# Patient Record
Sex: FEMALE | Race: WHITE | Employment: OTHER | URBAN - NONMETROPOLITAN AREA
[De-identification: names, ages, dates, MRNs, and addresses within clinical notes are randomized per-mention and may not be internally consistent; named-entity substitution may affect disease eponyms.]

---

## 2019-04-05 ENCOUNTER — APPOINTMENT (OUTPATIENT)
Dept: GENERAL RADIOLOGY | Age: 69
DRG: 373 | End: 2019-04-05
Payer: MEDICARE

## 2019-04-05 ENCOUNTER — APPOINTMENT (OUTPATIENT)
Dept: CT IMAGING | Age: 69
DRG: 373 | End: 2019-04-05
Payer: MEDICARE

## 2019-04-05 ENCOUNTER — HOSPITAL ENCOUNTER (INPATIENT)
Age: 69
LOS: 3 days | Discharge: HOME | DRG: 373 | End: 2019-04-08
Attending: EMERGENCY MEDICINE | Admitting: INTERNAL MEDICINE
Payer: MEDICARE

## 2019-04-05 DIAGNOSIS — I48.91 NEW ONSET ATRIAL FIBRILLATION (HCC): ICD-10-CM

## 2019-04-05 DIAGNOSIS — S09.90XA INJURY OF HEAD, INITIAL ENCOUNTER: Primary | ICD-10-CM

## 2019-04-05 DIAGNOSIS — R77.8 ELEVATED TROPONIN: ICD-10-CM

## 2019-04-05 DIAGNOSIS — J18.9 PNEUMONIA DUE TO ORGANISM: ICD-10-CM

## 2019-04-05 DIAGNOSIS — J10.1 INFLUENZA A: ICD-10-CM

## 2019-04-05 DIAGNOSIS — E86.0 DEHYDRATION: ICD-10-CM

## 2019-04-05 DIAGNOSIS — S01.01XA LACERATION OF SCALP, INITIAL ENCOUNTER: ICD-10-CM

## 2019-04-05 LAB
ALBUMIN SERPL-MCNC: 3.7 GM/DL (ref 3.4–5)
ALP BLD-CCNC: 46 U/L (ref 46–116)
ALT SERPL-CCNC: 33 U/L (ref 14–63)
ANION GAP: 18 MEQ/L (ref 8–16)
AST SERPL-CCNC: 49 U/L (ref 15–37)
BILIRUB SERPL-MCNC: 1.1 MG/DL (ref 0.2–1)
BUN BLDV-MCNC: 30 MG/DL (ref 7–18)
CHLORIDE BLD-SCNC: 98 MEQ/L (ref 98–107)
CO2: 19 MEQ/L (ref 21–32)
CREAT SERPL-MCNC: 1.6 MG/DL (ref 0.6–1.3)
FLU A ANTIGEN: POSITIVE
FLU B ANTIGEN: NEGATIVE
GFR, ESTIMATED: 34 ML/MIN/1.73M2
GLUCOSE BLD-MCNC: 126 MG/DL (ref 74–106)
GLUCOSE BLD-MCNC: 90 MG/DL (ref 70–108)
HCT VFR BLD CALC: 43.4 % (ref 37–47)
HEMOGLOBIN: 14.6 GM/DL (ref 12–16)
LACTATE: 1.6 MMOL/L (ref 0.9–1.7)
MCH RBC QN AUTO: 27.9 PG (ref 27–31)
MCHC RBC AUTO-ENTMCNC: 33.7 GM/DL (ref 33–37)
MCV RBC AUTO: 82.8 FL (ref 81–99)
PDW BLD-RTO: 13.3 % (ref 11.5–14.5)
PLATELET # BLD: 192 THOU/MM3 (ref 130–400)
PMV BLD AUTO: 7.8 FL (ref 7.4–10.4)
POC CALCIUM: 9 MG/DL (ref 8.5–10.1)
POTASSIUM SERPL-SCNC: 3.9 MEQ/L (ref 3.5–5.1)
PROCALCITONIN: 0.08 NG/ML (ref 0.01–0.09)
RBC # BLD: 5.24 MILL/MM3 (ref 4.2–5.4)
SODIUM BLD-SCNC: 135 MEQ/L (ref 136–145)
TOTAL PROTEIN: 7.4 GM/DL (ref 6.4–8.2)
TROPONIN I: 0.07 NG/ML (ref 0.02–0.05)
TROPONIN T: < 0.01 NG/ML
TROPONIN T: < 0.01 NG/ML
WBC # BLD: 4.5 THOU/MM3 (ref 4.8–10.8)

## 2019-04-05 PROCEDURE — 2709999900 HC NON-CHARGEABLE SUPPLY

## 2019-04-05 PROCEDURE — 96368 THER/DIAG CONCURRENT INF: CPT

## 2019-04-05 PROCEDURE — 2500000003 HC RX 250 WO HCPCS: Performed by: EMERGENCY MEDICINE

## 2019-04-05 PROCEDURE — 83605 ASSAY OF LACTIC ACID: CPT

## 2019-04-05 PROCEDURE — 99223 1ST HOSP IP/OBS HIGH 75: CPT | Performed by: INTERNAL MEDICINE

## 2019-04-05 PROCEDURE — 99285 EMERGENCY DEPT VISIT HI MDM: CPT

## 2019-04-05 PROCEDURE — 2140000000 HC CCU INTERMEDIATE R&B

## 2019-04-05 PROCEDURE — 96376 TX/PRO/DX INJ SAME DRUG ADON: CPT

## 2019-04-05 PROCEDURE — 12051 INTMD RPR FACE/MM 2.5 CM/<: CPT

## 2019-04-05 PROCEDURE — 84484 ASSAY OF TROPONIN QUANT: CPT

## 2019-04-05 PROCEDURE — 80053 COMPREHEN METABOLIC PANEL: CPT

## 2019-04-05 PROCEDURE — 71101 X-RAY EXAM UNILAT RIBS/CHEST: CPT

## 2019-04-05 PROCEDURE — 84145 PROCALCITONIN (PCT): CPT

## 2019-04-05 PROCEDURE — 2580000003 HC RX 258: Performed by: INTERNAL MEDICINE

## 2019-04-05 PROCEDURE — 6360000002 HC RX W HCPCS: Performed by: EMERGENCY MEDICINE

## 2019-04-05 PROCEDURE — 82948 REAGENT STRIP/BLOOD GLUCOSE: CPT

## 2019-04-05 PROCEDURE — 36415 COLL VENOUS BLD VENIPUNCTURE: CPT

## 2019-04-05 PROCEDURE — 2580000003 HC RX 258: Performed by: EMERGENCY MEDICINE

## 2019-04-05 PROCEDURE — 96375 TX/PRO/DX INJ NEW DRUG ADDON: CPT

## 2019-04-05 PROCEDURE — 93005 ELECTROCARDIOGRAM TRACING: CPT | Performed by: EMERGENCY MEDICINE

## 2019-04-05 PROCEDURE — 85027 COMPLETE CBC AUTOMATED: CPT

## 2019-04-05 PROCEDURE — 87040 BLOOD CULTURE FOR BACTERIA: CPT

## 2019-04-05 PROCEDURE — 93005 ELECTROCARDIOGRAM TRACING: CPT | Performed by: INTERNAL MEDICINE

## 2019-04-05 PROCEDURE — 96372 THER/PROPH/DIAG INJ SC/IM: CPT

## 2019-04-05 PROCEDURE — 1200000000 HC SEMI PRIVATE

## 2019-04-05 PROCEDURE — 96365 THER/PROPH/DIAG IV INF INIT: CPT

## 2019-04-05 PROCEDURE — 87804 INFLUENZA ASSAY W/OPTIC: CPT

## 2019-04-05 PROCEDURE — 70450 CT HEAD/BRAIN W/O DYE: CPT

## 2019-04-05 PROCEDURE — 6370000000 HC RX 637 (ALT 250 FOR IP): Performed by: INTERNAL MEDICINE

## 2019-04-05 RX ORDER — SODIUM CHLORIDE 9 MG/ML
INJECTION, SOLUTION INTRAVENOUS CONTINUOUS
Status: ACTIVE | OUTPATIENT
Start: 2019-04-05 | End: 2019-04-06

## 2019-04-05 RX ORDER — NITROGLYCERIN 0.4 MG/1
0.4 TABLET SUBLINGUAL EVERY 5 MIN PRN
Status: DISCONTINUED | OUTPATIENT
Start: 2019-04-05 | End: 2019-04-08 | Stop reason: HOSPADM

## 2019-04-05 RX ORDER — DILTIAZEM HYDROCHLORIDE 5 MG/ML
10 INJECTION INTRAVENOUS ONCE
Status: COMPLETED | OUTPATIENT
Start: 2019-04-05 | End: 2019-04-05

## 2019-04-05 RX ORDER — LOSARTAN POTASSIUM 50 MG/1
50 TABLET ORAL DAILY
Status: DISCONTINUED | OUTPATIENT
Start: 2019-04-05 | End: 2019-04-05

## 2019-04-05 RX ORDER — ZOLPIDEM TARTRATE 5 MG/1
5 TABLET ORAL NIGHTLY PRN
Status: DISCONTINUED | OUTPATIENT
Start: 2019-04-05 | End: 2019-04-08 | Stop reason: HOSPADM

## 2019-04-05 RX ORDER — ONDANSETRON 2 MG/ML
4 INJECTION INTRAMUSCULAR; INTRAVENOUS EVERY 6 HOURS PRN
Status: DISCONTINUED | OUTPATIENT
Start: 2019-04-05 | End: 2019-04-08 | Stop reason: HOSPADM

## 2019-04-05 RX ORDER — LOSARTAN POTASSIUM 50 MG/1
50 TABLET ORAL DAILY
COMMUNITY

## 2019-04-05 RX ORDER — CITALOPRAM 10 MG/1
10 TABLET ORAL DAILY
COMMUNITY

## 2019-04-05 RX ORDER — LORAZEPAM 2 MG/ML
0.5 INJECTION INTRAMUSCULAR ONCE
Status: COMPLETED | OUTPATIENT
Start: 2019-04-05 | End: 2019-04-05

## 2019-04-05 RX ORDER — MELOXICAM 15 MG/1
15 TABLET ORAL DAILY
Status: ON HOLD | COMMUNITY
End: 2019-04-08 | Stop reason: HOSPADM

## 2019-04-05 RX ORDER — ISOSORBIDE MONONITRATE 30 MG/1
30 TABLET, EXTENDED RELEASE ORAL DAILY
COMMUNITY

## 2019-04-05 RX ORDER — EXEMESTANE 25 MG/1
25 TABLET ORAL NIGHTLY
Status: DISCONTINUED | OUTPATIENT
Start: 2019-04-05 | End: 2019-04-08 | Stop reason: HOSPADM

## 2019-04-05 RX ORDER — SODIUM CHLORIDE 0.9 % (FLUSH) 0.9 %
10 SYRINGE (ML) INJECTION PRN
Status: DISCONTINUED | OUTPATIENT
Start: 2019-04-05 | End: 2019-04-08 | Stop reason: HOSPADM

## 2019-04-05 RX ORDER — OSELTAMIVIR PHOSPHATE 30 MG/1
30 CAPSULE ORAL 2 TIMES DAILY
Status: DISCONTINUED | OUTPATIENT
Start: 2019-04-05 | End: 2019-04-06

## 2019-04-05 RX ORDER — CLOPIDOGREL BISULFATE 75 MG/1
75 TABLET ORAL DAILY
Status: DISCONTINUED | OUTPATIENT
Start: 2019-04-05 | End: 2019-04-08 | Stop reason: HOSPADM

## 2019-04-05 RX ORDER — LEVOFLOXACIN 5 MG/ML
500 INJECTION, SOLUTION INTRAVENOUS ONCE
Status: COMPLETED | OUTPATIENT
Start: 2019-04-05 | End: 2019-04-05

## 2019-04-05 RX ORDER — ATORVASTATIN CALCIUM 40 MG/1
40 TABLET, FILM COATED ORAL NIGHTLY
Status: DISCONTINUED | OUTPATIENT
Start: 2019-04-05 | End: 2019-04-08 | Stop reason: HOSPADM

## 2019-04-05 RX ORDER — ACETAMINOPHEN 325 MG/1
650 TABLET ORAL EVERY 4 HOURS PRN
Status: DISCONTINUED | OUTPATIENT
Start: 2019-04-05 | End: 2019-04-08 | Stop reason: HOSPADM

## 2019-04-05 RX ORDER — SODIUM CHLORIDE 0.9 % (FLUSH) 0.9 %
10 SYRINGE (ML) INJECTION EVERY 12 HOURS SCHEDULED
Status: DISCONTINUED | OUTPATIENT
Start: 2019-04-05 | End: 2019-04-08 | Stop reason: HOSPADM

## 2019-04-05 RX ORDER — MELOXICAM 7.5 MG/1
15 TABLET ORAL DAILY
Status: DISCONTINUED | OUTPATIENT
Start: 2019-04-05 | End: 2019-04-08 | Stop reason: HOSPADM

## 2019-04-05 RX ORDER — PANTOPRAZOLE SODIUM 40 MG/1
40 TABLET, DELAYED RELEASE ORAL
Status: DISCONTINUED | OUTPATIENT
Start: 2019-04-06 | End: 2019-04-06

## 2019-04-05 RX ORDER — OMEPRAZOLE 20 MG/1
20 CAPSULE, DELAYED RELEASE ORAL DAILY
COMMUNITY

## 2019-04-05 RX ORDER — 0.9 % SODIUM CHLORIDE 0.9 %
1000 INTRAVENOUS SOLUTION INTRAVENOUS ONCE
Status: COMPLETED | OUTPATIENT
Start: 2019-04-05 | End: 2019-04-05

## 2019-04-05 RX ORDER — DILTIAZEM HYDROCHLORIDE 5 MG/ML
5 INJECTION INTRAVENOUS ONCE
Status: COMPLETED | OUTPATIENT
Start: 2019-04-05 | End: 2019-04-05

## 2019-04-05 RX ORDER — DIGOXIN 0.25 MG/ML
500 INJECTION INTRAMUSCULAR; INTRAVENOUS ONCE
Status: COMPLETED | OUTPATIENT
Start: 2019-04-05 | End: 2019-04-05

## 2019-04-05 RX ORDER — LIDOCAINE HYDROCHLORIDE AND EPINEPHRINE 10; 10 MG/ML; UG/ML
20 INJECTION, SOLUTION INFILTRATION; PERINEURAL ONCE
Status: COMPLETED | OUTPATIENT
Start: 2019-04-05 | End: 2019-04-05

## 2019-04-05 RX ORDER — ASPIRIN 325 MG
162 TABLET ORAL DAILY
Status: DISCONTINUED | OUTPATIENT
Start: 2019-04-05 | End: 2019-04-08 | Stop reason: HOSPADM

## 2019-04-05 RX ORDER — ISOSORBIDE MONONITRATE 30 MG/1
30 TABLET, EXTENDED RELEASE ORAL DAILY
Status: DISCONTINUED | OUTPATIENT
Start: 2019-04-06 | End: 2019-04-08 | Stop reason: HOSPADM

## 2019-04-05 RX ORDER — CITALOPRAM 20 MG/1
10 TABLET ORAL DAILY
Status: DISCONTINUED | OUTPATIENT
Start: 2019-04-05 | End: 2019-04-08 | Stop reason: HOSPADM

## 2019-04-05 RX ADMIN — CITALOPRAM 10 MG: 20 TABLET, FILM COATED ORAL at 20:54

## 2019-04-05 RX ADMIN — SODIUM CHLORIDE 1000 ML: 9 INJECTION, SOLUTION INTRAVENOUS at 13:23

## 2019-04-05 RX ADMIN — SODIUM CHLORIDE: 9 INJECTION, SOLUTION INTRAVENOUS at 20:28

## 2019-04-05 RX ADMIN — LORAZEPAM 0.5 MG: 2 INJECTION INTRAMUSCULAR; INTRAVENOUS at 16:22

## 2019-04-05 RX ADMIN — ACETAMINOPHEN 650 MG: 325 TABLET ORAL at 21:31

## 2019-04-05 RX ADMIN — ATORVASTATIN CALCIUM 40 MG: 40 TABLET, FILM COATED ORAL at 20:24

## 2019-04-05 RX ADMIN — ENOXAPARIN SODIUM 90 MG: 100 INJECTION SUBCUTANEOUS at 16:00

## 2019-04-05 RX ADMIN — LIDOCAINE HYDROCHLORIDE,EPINEPHRINE BITARTRATE 20 ML: 10; .01 INJECTION, SOLUTION INFILTRATION; PERINEURAL at 13:30

## 2019-04-05 RX ADMIN — CLOPIDOGREL BISULFATE 75 MG: 75 TABLET ORAL at 20:21

## 2019-04-05 RX ADMIN — ASPIRIN 162 MG: 325 TABLET ORAL at 20:21

## 2019-04-05 RX ADMIN — DILTIAZEM HYDROCHLORIDE 10 MG: 5 INJECTION INTRAVENOUS at 15:32

## 2019-04-05 RX ADMIN — OSELTAMIVIR PHOSPHATE 30 MG: 30 CAPSULE ORAL at 21:20

## 2019-04-05 RX ADMIN — METOPROLOL TARTRATE 37.5 MG: 25 TABLET ORAL at 20:25

## 2019-04-05 RX ADMIN — DILTIAZEM HYDROCHLORIDE 5 MG/ML: 5 INJECTION INTRAVENOUS at 15:35

## 2019-04-05 RX ADMIN — DILTIAZEM HYDROCHLORIDE 5 MG: 5 INJECTION INTRAVENOUS at 16:03

## 2019-04-05 RX ADMIN — LEVOFLOXACIN 500 MG: 5 INJECTION, SOLUTION INTRAVENOUS at 15:12

## 2019-04-05 RX ADMIN — DIGOXIN 500 MCG: 0.25 INJECTION INTRAMUSCULAR; INTRAVENOUS at 15:29

## 2019-04-05 ASSESSMENT — ENCOUNTER SYMPTOMS
EYE DISCHARGE: 0
SHORTNESS OF BREATH: 0
BLOOD IN STOOL: 0
ABDOMINAL PAIN: 0
SORE THROAT: 0
EYE PAIN: 0
WHEEZING: 0
COUGH: 1
DIARRHEA: 1
VOMITING: 0
BACK PAIN: 1
NAUSEA: 1

## 2019-04-05 ASSESSMENT — PAIN SCALES - GENERAL
PAINLEVEL_OUTOF10: 0
PAINLEVEL_OUTOF10: 7
PAINLEVEL_OUTOF10: 2
PAINLEVEL_OUTOF10: 4

## 2019-04-05 ASSESSMENT — PAIN DESCRIPTION - LOCATION
LOCATION: RIB CAGE
LOCATION: BACK;RIB CAGE
LOCATION: BACK;RIB CAGE

## 2019-04-05 ASSESSMENT — PAIN DESCRIPTION - PAIN TYPE
TYPE: ACUTE PAIN

## 2019-04-05 ASSESSMENT — PAIN DESCRIPTION - ORIENTATION
ORIENTATION: LEFT

## 2019-04-05 NOTE — ED NOTES
Dr Raylene Leyden talking with Dr Nancy Serrato and admission process started.      Nathan Swann, MELINDA  04/05/19 5796

## 2019-04-05 NOTE — ED PROVIDER NOTES
(Bilateral); Ectopic pregnancy surgery; and Finger surgery. CURRENT MEDICATIONS       Previous Medications    ASPIRIN 81 MG TABLET    Take 162 mg by mouth daily     ATORVASTATIN (LIPITOR) 40 MG TABLET    Take 1 tablet by mouth nightly    CITALOPRAM (CELEXA) 10 MG TABLET    Take 10 mg by mouth daily    CLOPIDOGREL (PLAVIX) 75 MG TABLET    Take 1 tablet by mouth daily    EXEMESTANE (AROMASIN) 25 MG CHEMO TABLET    Take 25 mg by mouth nightly    ISOSORBIDE MONONITRATE (IMDUR) 30 MG EXTENDED RELEASE TABLET    Take 30 mg by mouth daily    LOSARTAN (COZAAR) 50 MG TABLET    Take 50 mg by mouth daily    MELOXICAM (MOBIC) 15 MG TABLET    Take 15 mg by mouth daily    METOPROLOL (LOPRESSOR) 25 MG TABLET    Take 25 mg by mouth 2 times daily    NITROGLYCERIN (NITROSTAT) 0.4 MG SL TABLET    Place 1 tablet under the tongue every 5 minutes as needed for Chest pain    OMEPRAZOLE (PRILOSEC) 20 MG DELAYED RELEASE CAPSULE    Take 20 mg by mouth daily    ZOLPIDEM (AMBIEN) 10 MG TABLET    Take by mouth nightly as needed for Sleep       ALLERGIES     is allergic to bactrim [sulfamethoxazole-trimethoprim]. FAMILY HISTORY     indicated that her mother is . She indicated that her father is . She indicated that the status of her neg hx is unknown. She indicated that the status of her maternal cousin is unknown.   family history includes Diabetes in her maternal cousin and mother; High Blood Pressure in her father; Stroke in her father. SOCIAL HISTORY      reports that she has never smoked. She has never used smokeless tobacco. She reports that she does not drink alcohol or use drugs. PHYSICAL EXAM     INITIAL VITALS:  height is 5' 6\" (1.676 m) and weight is 220 lb (99.8 kg). Her oral temperature is 98.2 °F (36.8 °C). Her blood pressure is 109/91 (abnormal) and her pulse is 161. Her respiration is 12 and oxygen saturation is 95%. Physical Exam   Constitutional: She is oriented to person, place, and time.  She (*)     AST 49 (*)     Total Bilirubin 1.1 (*)     All other components within normal limits   GLOMERULAR FILTRATION RATE, ESTIMATED - Abnormal; Notable for the following components:    GFR, Estimated 34 (*)     All other components within normal limits   ANION GAP - Abnormal; Notable for the following components:    Anion Gap 18.0 (*)     All other components within normal limits   CULTURE BLOOD #1   CULTURE BLOOD #2   TROPONIN   LACTIC ACID   Laboratory studies showed the elevation of the creatinine, slightly elevated troponin and possible influenza A. EMERGENCY DEPARTMENT COURSE:   Vitals:    Vitals:    04/05/19 1234 04/05/19 1457 04/05/19 1523 04/05/19 1546   BP: (!) 137/100 (!) 125/100 (!) 112/90 (!) 109/91   Pulse: 98 140 173 161   Resp: 18 20 17 12   Temp: 98.2 °F (36.8 °C)      TempSrc: Oral      SpO2: 97% 97% 96% 95%   Weight: 220 lb (99.8 kg)      Height: 5' 6\" (1.676 m)        She received normal saline IV, Levaquin IV. At 3:20 PM.  She went into A. fib. Heart rate was between 160-180. She was started on a reasonable is on trips. She also was given digoxin 0.5 mg IV. Office was given Lovenox 90 mg subcu. She remained awake and alert. Reevaluation at 4.25 p.m:  Is awake, alert, she converted to sinus rhythm, heart rate was 99. PROCEDURES:  Forehead laceration repair:  2 cm laceration above the medial aspect of her left eyebrow. Topical anesthesia was achieved with 5 mL lidocaine with epinephrine. I explored the wound. There is no foreign body, irrigated with saline and Betadine. Simultaneous tissue was repaired with 4 interrupted stitches of 4. 0 Vicryl. The skin was repaired with 6 interrupted stitches of 5. 0 nylon. Critical care:  Due to the immediate potential for life-threatening deterioration due to head injury, new -onset A. fib, dehydration and pneumonia , I spent 35  minutes providing critical care. This time is excluding time spent performing procedures.     FINAL IMPRESSION 1. Injury of head, initial encounter    2. Laceration of scalp, initial encounter    3. Pneumonia due to organism    4. Dehydration    5. Elevated troponin    6. New onset atrial fibrillation ([facility] [address]    7.  Influenza A          DISPOSITION/PLAN   Admit to the hospital, condition is critical    PATIENT REFERRED TO:  Deion Lucero, [facility]            DISCHARGE MEDICATIONS:  New Prescriptions    No medications on file       (Please note that portions of this note were completed with a voice recognition program.  Efforts were made to edit the dictations but occasionally words are mis-transcribed.)    MD Ricki Alves MD  04/05/19 9201

## 2019-04-05 NOTE — ED NOTES
Pt resting in room, pt denies any complaints. Monitor:NSR rate 94, Dr Clementine Joyner informed.      Reid Copeland RN  04/05/19 3452

## 2019-04-05 NOTE — PROGRESS NOTES
Pharmacy Renal Adjustment    Nicole Najera is a 79 y.o. female. Pharmacy renally adjust the following medications per P&T approved policy: Tamiflu    Recent Labs     04/05/19  1329   BUN 30*       Recent Labs     04/05/19  1329   CREATININE 1.6*       Estimated Creatinine Clearance: 41 mL/min (A) (based on SCr of 1.6 mg/dL (H)). Calculated CrCl:    Height:   Ht Readings from Last 1 Encounters:   04/05/19 5' 6\" (1.676 m)     Weight:  Wt Readings from Last 1 Encounters:   04/05/19 220 lb (99.8 kg)       CKD stage: 1         Baseline SCr: 0.8-1.2  Results for Franko Mccartney (MRN [id number]) as of 4/5/2019 19:29   Ref.  Range 4/2/2012 10:03 9/3/2015 08:28 9/4/2015 04:34 2/12/2019 09:45 4/5/2019 13:29   Creatinine Latest Ref Range: 0.6 - 1.3 mg/dl 0.8 1.2 0.8 1.1 1.6 (H)       Plan: Adjustments based on renal function:          Decrease oseltamivir to 30 mg bid

## 2019-04-06 PROBLEM — A04.72 C. DIFFICILE DIARRHEA: Status: ACTIVE | Noted: 2019-04-06

## 2019-04-06 PROBLEM — I48.91 ATRIAL FIBRILLATION, RAPID (HCC): Status: ACTIVE | Noted: 2019-04-06

## 2019-04-06 LAB
ADENOVIRUS F 40 41 PCR: NOT DETECTED
ANION GAP SERPL CALCULATED.3IONS-SCNC: 14 MEQ/L (ref 8–16)
ASTROVIRUS PCR: NOT DETECTED
BUN BLDV-MCNC: 22 MG/DL (ref 7–22)
CALCIUM SERPL-MCNC: 8 MG/DL (ref 8.5–10.5)
CAMPYLOBACTER PCR: NOT DETECTED
CHLORIDE BLD-SCNC: 107 MEQ/L (ref 98–111)
CLOSTRIDIUM DIFFICILE, PCR: DETECTED
CO2: 16 MEQ/L (ref 23–33)
CREAT SERPL-MCNC: 1 MG/DL (ref 0.4–1.2)
CRYPTOSPORIDIUM PCR: NOT DETECTED
CYCLOSPORA CAYETANENSIS PCR: NOT DETECTED
E COLI 0157 PCR: ABNORMAL
E COLI ENTEROAGGREGATIVE PCR: NOT DETECTED
E COLI ENTEROPATHOGENIC PCR: NOT DETECTED
E COLI ENTEROTOXIGENIC PCR: NOT DETECTED
E COLI SHIGA LIKE TOXIN PCR: NOT DETECTED
E COLI SHIGELLA/ENTEROINVASIVE PCR: NOT DETECTED
E HISTOLYTICA GI FILM ARRAY: NOT DETECTED
EKG ATRIAL RATE: 149 BPM
EKG ATRIAL RATE: 65 BPM
EKG ATRIAL RATE: 87 BPM
EKG P AXIS: 59 DEGREES
EKG P AXIS: 71 DEGREES
EKG P AXIS: 95 DEGREES
EKG P-R INTERVAL: 126 MS
EKG P-R INTERVAL: 150 MS
EKG P-R INTERVAL: 156 MS
EKG Q-T INTERVAL: 310 MS
EKG Q-T INTERVAL: 374 MS
EKG Q-T INTERVAL: 434 MS
EKG QRS DURATION: 100 MS
EKG QRS DURATION: 90 MS
EKG QRS DURATION: 96 MS
EKG QTC CALCULATION (BAZETT): 450 MS
EKG QTC CALCULATION (BAZETT): 451 MS
EKG QTC CALCULATION (BAZETT): 488 MS
EKG R AXIS: -25 DEGREES
EKG R AXIS: -26 DEGREES
EKG R AXIS: -51 DEGREES
EKG T AXIS: 70 DEGREES
EKG T AXIS: 74 DEGREES
EKG T AXIS: 85 DEGREES
EKG VENTRICULAR RATE: 149 BPM
EKG VENTRICULAR RATE: 65 BPM
EKG VENTRICULAR RATE: 87 BPM
ERYTHROCYTE [DISTWIDTH] IN BLOOD BY AUTOMATED COUNT: 14.6 % (ref 11.5–14.5)
ERYTHROCYTE [DISTWIDTH] IN BLOOD BY AUTOMATED COUNT: 46.9 FL (ref 35–45)
GFR SERPL CREATININE-BSD FRML MDRD: 55 ML/MIN/1.73M2
GIARDIA LAMBLIA PCR: NOT DETECTED
GLUCOSE BLD-MCNC: 102 MG/DL (ref 70–108)
GLUCOSE BLD-MCNC: 96 MG/DL (ref 70–108)
HCT VFR BLD CALC: 40.9 % (ref 37–47)
HEMOGLOBIN: 12.5 GM/DL (ref 12–16)
MAGNESIUM: 2.1 MG/DL (ref 1.6–2.4)
MCH RBC QN AUTO: 26.9 PG (ref 26–33)
MCHC RBC AUTO-ENTMCNC: 30.6 GM/DL (ref 32.2–35.5)
MCV RBC AUTO: 88 FL (ref 81–99)
NOROVIRUS GI GII PCR: NOT DETECTED
PLATELET # BLD: 151 THOU/MM3 (ref 130–400)
PLESIOMONAS SHIGELLOIDES PCR: NOT DETECTED
PMV BLD AUTO: 10.4 FL (ref 9.4–12.4)
POTASSIUM REFLEX MAGNESIUM: 3.7 MEQ/L (ref 3.5–5.2)
RBC # BLD: 4.65 MILL/MM3 (ref 4.2–5.4)
ROTAVIRUS A PCR: NOT DETECTED
SALMONELLA PCR: NOT DETECTED
SAPOVIRUS PCR: NOT DETECTED
SODIUM BLD-SCNC: 137 MEQ/L (ref 135–145)
VIBRIO CHOLERAE PCR: NOT DETECTED
VIBRIO PCR: NOT DETECTED
WBC # BLD: 5.1 THOU/MM3 (ref 4.8–10.8)
YERSINIA ENTEROCOLITICA PCR: NOT DETECTED

## 2019-04-06 PROCEDURE — 6370000000 HC RX 637 (ALT 250 FOR IP): Performed by: INTERNAL MEDICINE

## 2019-04-06 PROCEDURE — 85027 COMPLETE CBC AUTOMATED: CPT

## 2019-04-06 PROCEDURE — 6370000000 HC RX 637 (ALT 250 FOR IP): Performed by: PHYSICIAN ASSISTANT

## 2019-04-06 PROCEDURE — 2580000003 HC RX 258: Performed by: INTERNAL MEDICINE

## 2019-04-06 PROCEDURE — 93010 ELECTROCARDIOGRAM REPORT: CPT | Performed by: INTERNAL MEDICINE

## 2019-04-06 PROCEDURE — 83735 ASSAY OF MAGNESIUM: CPT

## 2019-04-06 PROCEDURE — 2140000000 HC CCU INTERMEDIATE R&B

## 2019-04-06 PROCEDURE — 99233 SBSQ HOSP IP/OBS HIGH 50: CPT | Performed by: INTERNAL MEDICINE

## 2019-04-06 PROCEDURE — 82948 REAGENT STRIP/BLOOD GLUCOSE: CPT

## 2019-04-06 PROCEDURE — 36415 COLL VENOUS BLD VENIPUNCTURE: CPT

## 2019-04-06 PROCEDURE — 80048 BASIC METABOLIC PNL TOTAL CA: CPT

## 2019-04-06 PROCEDURE — 6360000002 HC RX W HCPCS: Performed by: INTERNAL MEDICINE

## 2019-04-06 PROCEDURE — 2709999900 HC NON-CHARGEABLE SUPPLY

## 2019-04-06 PROCEDURE — 87507 IADNA-DNA/RNA PROBE TQ 12-25: CPT

## 2019-04-06 PROCEDURE — 93005 ELECTROCARDIOGRAM TRACING: CPT | Performed by: INTERNAL MEDICINE

## 2019-04-06 PROCEDURE — 6370000000 HC RX 637 (ALT 250 FOR IP): Performed by: PHARMACIST

## 2019-04-06 RX ORDER — OSELTAMIVIR PHOSPHATE 75 MG/1
75 CAPSULE ORAL 2 TIMES DAILY
Status: DISCONTINUED | OUTPATIENT
Start: 2019-04-06 | End: 2019-04-08 | Stop reason: HOSPADM

## 2019-04-06 RX ORDER — POTASSIUM CHLORIDE 20 MEQ/1
20 TABLET, EXTENDED RELEASE ORAL ONCE
Status: COMPLETED | OUTPATIENT
Start: 2019-04-06 | End: 2019-04-06

## 2019-04-06 RX ORDER — METOPROLOL TARTRATE 50 MG/1
50 TABLET, FILM COATED ORAL 2 TIMES DAILY
Status: DISCONTINUED | OUTPATIENT
Start: 2019-04-06 | End: 2019-04-08 | Stop reason: HOSPADM

## 2019-04-06 RX ORDER — BENZONATATE 100 MG/1
200 CAPSULE ORAL 3 TIMES DAILY PRN
Status: DISCONTINUED | OUTPATIENT
Start: 2019-04-06 | End: 2019-04-08 | Stop reason: HOSPADM

## 2019-04-06 RX ADMIN — ISOSORBIDE MONONITRATE 30 MG: 30 TABLET ORAL at 09:04

## 2019-04-06 RX ADMIN — SODIUM CHLORIDE: 9 INJECTION, SOLUTION INTRAVENOUS at 15:17

## 2019-04-06 RX ADMIN — Medication 10 ML: at 21:42

## 2019-04-06 RX ADMIN — METOPROLOL TARTRATE 37.5 MG: 25 TABLET ORAL at 06:53

## 2019-04-06 RX ADMIN — DILTIAZEM HYDROCHLORIDE 30 MG: 30 TABLET, FILM COATED ORAL at 17:22

## 2019-04-06 RX ADMIN — ATORVASTATIN CALCIUM 40 MG: 40 TABLET, FILM COATED ORAL at 21:41

## 2019-04-06 RX ADMIN — SODIUM CHLORIDE: 9 INJECTION, SOLUTION INTRAVENOUS at 02:48

## 2019-04-06 RX ADMIN — PANTOPRAZOLE SODIUM 40 MG: 40 TABLET, DELAYED RELEASE ORAL at 05:30

## 2019-04-06 RX ADMIN — Medication 125 MG: at 09:06

## 2019-04-06 RX ADMIN — BENZONATATE 200 MG: 100 CAPSULE ORAL at 11:27

## 2019-04-06 RX ADMIN — OSELTAMIVIR PHOSPHATE 75 MG: 75 CAPSULE ORAL at 09:08

## 2019-04-06 RX ADMIN — Medication 125 MG: at 13:27

## 2019-04-06 RX ADMIN — ASPIRIN 162 MG: 325 TABLET ORAL at 09:05

## 2019-04-06 RX ADMIN — POTASSIUM CHLORIDE 20 MEQ: 20 TABLET, EXTENDED RELEASE ORAL at 07:27

## 2019-04-06 RX ADMIN — Medication 125 MG: at 17:17

## 2019-04-06 RX ADMIN — ZOLPIDEM TARTRATE 5 MG: 5 TABLET ORAL at 21:41

## 2019-04-06 RX ADMIN — CLOPIDOGREL BISULFATE 75 MG: 75 TABLET ORAL at 09:04

## 2019-04-06 RX ADMIN — Medication 125 MG: at 21:42

## 2019-04-06 RX ADMIN — METOPROLOL TARTRATE 50 MG: 25 TABLET ORAL at 19:52

## 2019-04-06 RX ADMIN — ENOXAPARIN SODIUM 100 MG: 100 INJECTION SUBCUTANEOUS at 05:30

## 2019-04-06 RX ADMIN — OSELTAMIVIR PHOSPHATE 75 MG: 75 CAPSULE ORAL at 21:42

## 2019-04-06 RX ADMIN — ACETAMINOPHEN 650 MG: 325 TABLET ORAL at 21:17

## 2019-04-06 ASSESSMENT — PAIN SCALES - GENERAL
PAINLEVEL_OUTOF10: 0
PAINLEVEL_OUTOF10: 5

## 2019-04-06 ASSESSMENT — PAIN DESCRIPTION - LOCATION: LOCATION: BACK;RIB CAGE

## 2019-04-06 ASSESSMENT — PAIN DESCRIPTION - DESCRIPTORS: DESCRIPTORS: ACHING

## 2019-04-06 ASSESSMENT — PAIN DESCRIPTION - FREQUENCY: FREQUENCY: INTERMITTENT

## 2019-04-06 ASSESSMENT — PAIN DESCRIPTION - ONSET: ONSET: GRADUAL

## 2019-04-06 ASSESSMENT — PAIN DESCRIPTION - ORIENTATION: ORIENTATION: LEFT

## 2019-04-06 ASSESSMENT — PAIN DESCRIPTION - PAIN TYPE: TYPE: ACUTE PAIN

## 2019-04-06 NOTE — CONSULTS
[address] [place] [address]                                  CONSULTATION    PATIENT NAME: Greg Martin                  :        1950  MED REC NO:   [id number]                           ROOM:       0033  ACCOUNT NO:   [de-identified]                           ADMIT DATE: 2019  PROVIDER:     Saadia Bar. Kirby Casanova M.D.    Gladys Garrido:  2019    REASON FOR CONSULTATION:  Paroxysmal atrial fibrillation. HISTORY OF PRESENT ILLNESS:  This is a patient who is a 82-year-old lady  who is well known to me from prior encounters. She had a prior history  of intervention of the obtuse marginal few years ago. The patient  apparently has been having diarrhea and loss of appetite. She had been  drinking tea and she had syncopal episode while she was at home when she  moved her head from one direction to another. She did have trauma to  the front of the head with few sutures. The patient was found to have  pneumonia, was admitted to the hospital.  The patient denied chest pain  per se. She denied palpitations. While in the hospital, she had  episode of atrial fibrillation, paroxysmal, short-lived and variable in  degree. The patient in between had sinus bradycardia. The patient had  no similar episode in the past.    REVIEW OF SYSTEMS:  She had no history of CVA or TIA. No history of  thromboembolic phenomena. She is not aware of diabetes. She had a  history of borderline hypertension. She had a history of coronary  artery disease. As mentioned, the patient has no history of thyroid  disease. The patient has no history of GI bleed or bleeding disorder. She has been having diarrhea and she was found to have C. difficile. She was found also to have flu. The patient has no claudication. She  had a low-grade fever. No psychological disorders. SOCIAL HISTORY:  She denied smoking or alcohol abuse.     ALLERGIES:  The patient is allergic to BACTRIM. PHYSICAL EXAMINATION:  VITAL SIGNS:  When I saw the patient, she was in sinus rhythm. The  blood pressure was 106/61. Heart rate runs in the 60s. The patient's  monitor strip showed intermittent episodes of atrial fibrillation, rapid  ventricular response. It is getting better now, more in sinus rhythm  since her Lopressor dose increased and she was placed on Cardizem. NEUROLOGIC:  She had no focal neurological deficits. NECK:  She had no JVD. There were no carotid bruits. LUNGS:  She had scattered expiratory rhonchi. HEART:  There was no S3.  ABDOMEN:  Soft. GENITALIA/RECTAL:  Deferred. EXTREMITIES:  Lower limbs, there was no edema. LABORATORY DATA:  The patient's lab work on admission was BUN 30,  creatinine 1.6. The blood sugar was 126. Hemoglobin was 14.6 and  hematocrit was 43. Her EKG today showed sinus rhythm. No acute changes. SUMMARY:  1. The patient admitted with syncopal episode, probably postural  hypotension, exacerbated by sudden movement of the head and dehydration. 2.  The patient has been having loss of appetite, diarrhea, and she had  C. difficile, probably dehydration. She was drinking a lot of tea and  that is what increased her dehydration. 3.  History of coronary artery disease. History of prior intervention  of the circumflex, stable. 4.  History of paroxysmal atrial fibrillation. She is back in sinus  rhythm. She has been on aspirin and Plavix. The patient has a low  CHADS score at this point and with recent head trauma, I will try to  avoid anticoagulation. The patient seemed to be in sinus rhythm now. 5.  History of hypertension, under control. 6.  C. difficile. 7.  Pneumonia. Cardiacwise, the patient is stable. Continue current treatment. Medical management. The patient will be seen in the office. Pending  her clinical course, further recommendation will be made.     We thank you very much for allowing us to share in the management of  this patient. Please call if you have any questions. Rene Jones M.D.    D: 04/06/2019 13:28:49       T: 04/06/2019 13:56:53     [email or web address]  Job#: [id number]     Doc#: [id number]    CC:  Emily Donaldson M.D.        Referring Service

## 2019-04-06 NOTE — PLAN OF CARE
Problem: Falls - Risk of:  Goal: Will remain free from falls  Description  Will remain free from falls  Outcome: Met This Shift  Note:   Continue with fall precautions, patient up with assist.      Problem: Falls - Risk of:  Goal: Absence of physical injury  Description  Absence of physical injury  Outcome: Met This Shift     Problem: Pain:  Goal: Patient's pain/discomfort is manageable  Description  Patient's pain/discomfort is manageable  Outcome: Met This Shift  Note:   Denies pain today. Problem: SKIN INTEGRITY  Goal: Skin integrity is maintained or improved  Outcome: Met This Shift  Note:   Continue to assess sutures to forehead. Problem: Discharge Planning:  Goal: Patients continuum of care needs are met  Description  Patients continuum of care needs are met  Outcome: Ongoing  Note:   Continue discharge planning. Patient lives at home with . Care plan reviewed with patient. Patient verbalize understanding of the plan of care and contribute to goal setting.

## 2019-04-06 NOTE — FLOWSHEET NOTE
04/06/19 0520   Provider Notification   Reason for Communication Evaluate   Provider Name [facility]   Provider Notification Physician Assistant   Method of Communication Secure Message   Response Waiting for response   Notification Time 0520   Potassium 3.7 this morning. Can we have cardiac replacement please? She has 29 beats of asymptomatic v tach tonight.

## 2019-04-06 NOTE — PLAN OF CARE
Problem: Falls - Risk of:  Goal: Will remain free from falls  Description  Will remain free from falls  Note:   Assessment & interventions provided throughout shift. Bed locked & in low position, call light in reach, side-rails up x2, non-slip socks on when ambulating, reminded patient to use call light to call for assistance. Bed alarm on. Problem: Pain:  Goal: Patient's pain/discomfort is manageable  Description  Patient's pain/discomfort is manageable  Note:   Ongoing assessment & interventions provided throughout shift. Reminded patient to report any pain, pressure, or shortness of breath to the nurse. Pain medications provided per physician's orders. Problem: Discharge Planning:  Goal: Patients continuum of care needs are met  Description  Patients continuum of care needs are met  Note:   Patient from home with . Problem: SKIN INTEGRITY  Goal: Skin integrity is maintained or improved  Note:   Ongoing assessment & interventions provided throughout shift. Skin assessments provided. Encouraging patient to turn as needed. Patient incontinent of stool- keep clean and dry. Care plan reviewed with patient. Patient verbalizes understanding of the care plan and contributed to goal setting.

## 2019-04-06 NOTE — H&P
MG tablet Take by mouth nightly as needed for Sleep   Yes Historical Provider, MD   nitroGLYCERIN (NITROSTAT) 0.4 MG SL tablet Place 1 tablet under the tongue every 5 minutes as needed for Chest pain 9/4/15   Javier Thakkar MD   exemestane (AROMASIN) 25 MG chemo tablet Take 25 mg by mouth nightly    Historical Provider, MD       Allergies:  Bactrim [sulfamethoxazole-trimethoprim]    Social History:      The patient currently lives at home    TOBACCO:   reports that she has never smoked. She has never used smokeless tobacco.  ETOH:   reports that she does not drink alcohol. Family History:      Reviewed in detail. Positive as follows:        Problem Relation Age of Onset    Diabetes Mother         adult onset    High Blood Pressure Father     Stroke Father     Diabetes Maternal Cousin     Cancer Neg Hx     Heart Disease Neg Hx        REVIEW OF SYSTEMS:   14 point review of system performed, pertinent positives as noted in the HPI, all other systems negative/at baseline. PHYSICAL EXAM:    /80   Pulse 69   Temp 97.8 °F (36.6 °C) (Oral)   Resp 16   Ht 5' 6\" (1.676 m)   Wt 218 lb (98.9 kg)   SpO2 96%   BMI 35.19 kg/m²       General appearance:  No apparent distress, appears stated age and cooperative. HEENT:  Normal cephalic, atraumatic without obvious deformity. Pupils equal, round, and reactive to light. Extra ocular muscles intact. Conjunctivae/corneas clear. Neck: Supple, with full range of motion. No jugular venous distention. Trachea midline. Respiratory:  Normal respiratory effort. Clear to auscultation, bilaterally without Rales/Wheezes/Rhonchi. Cardiovascular:  Regular rate and rhythm with normal S1/S2 without murmurs, rubs or gallops. Abdomen: Soft, non-tender, non-distended with normal bowel sounds. Musculoskeletal:  No clubbing, cyanosis or edema bilaterally. Full range of motion without deformity.   Skin: Skin color, texture, turgor normal.  No rashes or lesions but repaired lac above left eye. Neurologic:  Neurovascularly intact without any focal sensory/motor deficits. Cranial nerves: II-XII intact, grossly non-focal.  Psychiatric:  Alert and oriented, thought content appropriate, normal insight  Capillary Refill: Brisk,< 3 seconds   Peripheral Pulses: +2 palpable, equal bilaterally       Labs:     Recent Labs     04/05/19  1329 04/06/19  0357   WBC 4.5* 5.1   HGB 14.6 12.5   HCT 43.4 40.9    151     Recent Labs     04/05/19  1329 04/06/19  0357   * 137   K 3.9 3.7   CL 98 107   CO2 19* 16*   BUN 30* 22   CREATININE 1.6* 1.0   CALCIUM  --  8.0*     Recent Labs     04/05/19  1329   AST 49*   ALT 33   BILITOT 1.1*   ALKPHOS 46     No results for input(s): INR in the last 72 hours. Recent Labs     04/05/19  1329   TROPONINI 0.065       Urinalysis:    No results found for: Addie Civil, BACTERIA, RBCUA, BLOODU, [address]    Radiology:   I have reviewed the imaging studies with the following interpretation:  XR RIBS LEFT INCLUDE CHEST (MIN 3 VIEWS)   Final Result   1. No rib fracture seen. 2. Small patch atelectasis/pneumonia left parahilar region. **This report has been created using voice recognition software. It may contain minor errors which are inherent in voice recognition technology. **      Final report electronically signed by Dr. Linn Boggs on 4/5/2019 1:53 PM      CT HEAD WO CONTRAST   Final Result    No evidence of acute intracranial abnormality. **This report has been created using voice recognition software. It may contain minor errors which are inherent in voice recognition technology. **      Final report electronically signed by Dr. Chidi Saba MD on 4/5/2019 1:53 PM          EKG:  I have reviewed the EKG with the following interpretation: NSR on tele    Diet:  DIET CARDIAC; No Caffeine    DVT prophylaxis: [x] Lovenox                                 [] SCDs                                 [] SQ Heparin                                 [] Encourage ambulation           [] Already on Anticoagulation    Code Status: Full Code      PT/OT Eval Status: not at this time    Disposition:    [x] Home       [] TCU       [] Rehab       [] Psych       [x] SNF       [] [facility]       [] Other-       Assessment and Plan:    Principal Problem:    Influenza A  Active Problems:    C. difficile diarrhea    Atrial fibrillation, rapid ([address]  Resolved Problems:    * No resolved hospital problems. *        · Admit for influenza and AFib  · Now in NSR, monitor on telemetry anticoagulate for now  · Tamiflu  · Sent stool for PCR testing, is positive for c diff, start PO vanco 125mg q6h  · Continue home medications, increase lopressor and hold losartan for now  · Continue with plavix and ASA      Thank you Nuzhat Olivo MD for the opportunity to be involved in this patient's care.     Electronically signed by Rupa Fair DO on 4/6/2019 at 7:17 AM

## 2019-04-06 NOTE — PROGRESS NOTES
Hospitalist Progress Note    Patient:  González Winkler      Unit/Bed:3B-33/033-A    YOB: 1950    MRN: [id number]       Acct: [de-identified]     PCP: Neli Jack MD    Date of Admission: 4/5/2019    Assessment/Plan:    1. Influenza- minmal sxs of- fatigue x one week; minimal cough. On rx  2. c diff pos- no diarrhea till admitted=on vanco  3. parox afib- new- boost metoprolol, added diltiazem. Lytes throid ok. Consult Dr Alfrieda Lesch. Concerned re full anticoagulation due to head injury 4.5.    4. Cad- trops neg, ekg no acute change        Expected discharge date:  1-2 days    Disposition:    [x] Home       [] TCU       [] Rehab       [] Psych       [] SNF       [] [facility]       [] Other-    Chief Complaint: dizzy, fell in [place], hit head. Hospital Course: Just arrived. Having short runs of afib/rvr.   trops neg     Subjective (past 24 hours): cough       Medications:  Reviewed    Infusion Medications    sodium chloride 100 mL/hr at 04/06/19 0248     Scheduled Medications    potassium (CARDIAC) replacement protocol   Other RX Placeholder    vancomycin  125 mg Oral 4x Daily    oseltamivir  75 mg Oral BID    metoprolol tartrate  50 mg Oral BID    diltiazem  30 mg Oral 4 times per day    [Held by provider] meloxicam  15 mg Oral Daily    isosorbide mononitrate  30 mg Oral Daily    exemestane  25 mg Oral Nightly    clopidogrel  75 mg Oral Daily    citalopram  10 mg Oral Daily    atorvastatin  40 mg Oral Nightly    aspirin  162 mg Oral Daily    sodium chloride flush  10 mL Intravenous 2 times per day     PRN Meds: benzonatate, zolpidem, nitroGLYCERIN, sodium chloride flush, magnesium hydroxide, ondansetron, acetaminophen      Intake/Output Summary (Last 24 hours) at 4/6/2019 0857  Last data filed at 4/6/2019 0423  Gross per 24 hour   Intake 1264.34 ml   Output 300 ml   Net 964.34 ml       Diet:  DIET CARDIAC; No Caffeine    Exam:  /88   Pulse 66   Temp 98.2 °F (36.8 °C) (Oral)   Resp 16   Ht 5' 6\" (1.676 m)   Wt 218 lb (98.9 kg)   SpO2 96%   BMI 35.19 kg/m²     General appearance: No apparent distress, appears stated age and cooperative. HEENT: sutures forehead  Neck: Supple, with full range of motion. No jugular venous distention. Trachea midline. Respiratory:   Fine wheezes  Cardiovascular: Regular rate and rhythm with normal S1/S2 without murmurs, rubs or gallops. Abdomen: Soft, non-tender, non-distended with normal bowel sounds. Musculoskeletal: passive and active ROM x 4 extremities. Skin: Skin color, texture, turgor normal.  No rashes or lesions. Neurologic:  Neurovascularly intact without any focal sensory/motor deficits. Cranial nerves: II-XII intact, grossly non-focal.  Psychiatric: Alert and oriented, thought content appropriate, normal insight  Capillary Refill: Brisk,< 3 seconds   Peripheral Pulses: +2 palpable, equal bilaterally       Labs:   Recent Labs     04/05/19  1329 04/06/19  0357   WBC 4.5* 5.1   HGB 14.6 12.5   HCT 43.4 40.9    151     Recent Labs     04/05/19  1329 04/06/19  0357   * 137   K 3.9 3.7   CL 98 107   CO2 19* 16*   BUN 30* 22   CREATININE 1.6* 1.0   CALCIUM  --  8.0*     Recent Labs     04/05/19  1329   AST 49*   ALT 33   BILITOT 1.1*   ALKPHOS 46     No results for input(s): INR in the last 72 hours. Recent Labs     04/05/19  1329   TROPONINI 0.065       Microbiology:      Urinalysis:    No results found for: NITRU, WBCUA, BACTERIA, RBCUA, BLOODU, SPECGRAV, GLUCOSEU    Radiology:  XR RIBS LEFT INCLUDE CHEST (MIN 3 VIEWS)   Final Result   1. No rib fracture seen. 2. Small patch atelectasis/pneumonia left parahilar region. **This report has been created using voice recognition software. It may contain minor errors which are inherent in voice recognition technology. **      Final report electronically signed by Dr. Tan Gee on 4/5/2019 1:53 PM      CT HEAD WO CONTRAST   Final Result    No evidence

## 2019-04-06 NOTE — PROGRESS NOTES
Renal Adjustment Follow-up    Recent Labs     04/05/19  1329 04/06/19  0357   BUN 30* 22       Recent Labs     04/05/19  1329 04/06/19  0357   CREATININE 1.6* 1.0       Estimated Creatinine Clearance: 66 mL/min (based on SCr of 1 mg/dL). Plan: Change Tamiflu 75 mg PO BID X 9 more doses.       Divine Babin PharmD 4/6/2019 7:38 AM

## 2019-04-06 NOTE — CONSULTS
[id number] consult dictated stable cardiac wise    will see at the office    discharge plans per admitting service

## 2019-04-07 ENCOUNTER — APPOINTMENT (OUTPATIENT)
Dept: GENERAL RADIOLOGY | Age: 69
DRG: 373 | End: 2019-04-07
Payer: MEDICARE

## 2019-04-07 PROCEDURE — 6370000000 HC RX 637 (ALT 250 FOR IP): Performed by: INTERNAL MEDICINE

## 2019-04-07 PROCEDURE — 2140000000 HC CCU INTERMEDIATE R&B

## 2019-04-07 PROCEDURE — 71046 X-RAY EXAM CHEST 2 VIEWS: CPT

## 2019-04-07 PROCEDURE — 2580000003 HC RX 258: Performed by: INTERNAL MEDICINE

## 2019-04-07 PROCEDURE — 2709999900 HC NON-CHARGEABLE SUPPLY

## 2019-04-07 PROCEDURE — 99233 SBSQ HOSP IP/OBS HIGH 50: CPT | Performed by: INTERNAL MEDICINE

## 2019-04-07 PROCEDURE — 94640 AIRWAY INHALATION TREATMENT: CPT

## 2019-04-07 PROCEDURE — 6370000000 HC RX 637 (ALT 250 FOR IP): Performed by: PHARMACIST

## 2019-04-07 RX ORDER — ALBUTEROL SULFATE 90 UG/1
2 AEROSOL, METERED RESPIRATORY (INHALATION) 4 TIMES DAILY
Status: DISCONTINUED | OUTPATIENT
Start: 2019-04-07 | End: 2019-04-08 | Stop reason: HOSPADM

## 2019-04-07 RX ORDER — HYDROCODONE BITARTRATE AND ACETAMINOPHEN 5; 325 MG/1; MG/1
2 TABLET ORAL EVERY 4 HOURS PRN
Status: DISCONTINUED | OUTPATIENT
Start: 2019-04-07 | End: 2019-04-08 | Stop reason: HOSPADM

## 2019-04-07 RX ORDER — LIDOCAINE 4 G/G
1 PATCH TOPICAL DAILY
Status: DISCONTINUED | OUTPATIENT
Start: 2019-04-07 | End: 2019-04-07 | Stop reason: SDUPTHER

## 2019-04-07 RX ORDER — HYDROCODONE BITARTRATE AND ACETAMINOPHEN 5; 325 MG/1; MG/1
1 TABLET ORAL EVERY 4 HOURS PRN
Status: DISCONTINUED | OUTPATIENT
Start: 2019-04-07 | End: 2019-04-08 | Stop reason: HOSPADM

## 2019-04-07 RX ORDER — LIDOCAINE 4 G/G
2 PATCH TOPICAL DAILY
Status: DISCONTINUED | OUTPATIENT
Start: 2019-04-07 | End: 2019-04-08 | Stop reason: HOSPADM

## 2019-04-07 RX ORDER — LIDOCAINE 4 G/G
3 PATCH TOPICAL DAILY
Status: DISCONTINUED | OUTPATIENT
Start: 2019-04-07 | End: 2019-04-08 | Stop reason: HOSPADM

## 2019-04-07 RX ORDER — LIDOCAINE 4 G/G
1 PATCH TOPICAL DAILY
Status: DISCONTINUED | OUTPATIENT
Start: 2019-04-07 | End: 2019-04-08 | Stop reason: HOSPADM

## 2019-04-07 RX ADMIN — ACETAMINOPHEN 650 MG: 325 TABLET ORAL at 12:42

## 2019-04-07 RX ADMIN — Medication 125 MG: at 18:31

## 2019-04-07 RX ADMIN — METOPROLOL TARTRATE 50 MG: 25 TABLET ORAL at 20:45

## 2019-04-07 RX ADMIN — Medication 10 ML: at 08:54

## 2019-04-07 RX ADMIN — Medication 125 MG: at 12:58

## 2019-04-07 RX ADMIN — OSELTAMIVIR PHOSPHATE 75 MG: 75 CAPSULE ORAL at 20:46

## 2019-04-07 RX ADMIN — ASPIRIN 162 MG: 325 TABLET ORAL at 08:54

## 2019-04-07 RX ADMIN — CLOPIDOGREL BISULFATE 75 MG: 75 TABLET ORAL at 08:53

## 2019-04-07 RX ADMIN — BENZONATATE 200 MG: 100 CAPSULE ORAL at 10:19

## 2019-04-07 RX ADMIN — Medication 2 PUFF: at 21:11

## 2019-04-07 RX ADMIN — DILTIAZEM HYDROCHLORIDE 30 MG: 30 TABLET, FILM COATED ORAL at 12:58

## 2019-04-07 RX ADMIN — ZOLPIDEM TARTRATE 5 MG: 5 TABLET ORAL at 20:59

## 2019-04-07 RX ADMIN — BENZONATATE 200 MG: 100 CAPSULE ORAL at 20:46

## 2019-04-07 RX ADMIN — OSELTAMIVIR PHOSPHATE 75 MG: 75 CAPSULE ORAL at 08:53

## 2019-04-07 RX ADMIN — DILTIAZEM HYDROCHLORIDE 30 MG: 30 TABLET, FILM COATED ORAL at 18:36

## 2019-04-07 RX ADMIN — ATORVASTATIN CALCIUM 40 MG: 40 TABLET, FILM COATED ORAL at 20:45

## 2019-04-07 RX ADMIN — ISOSORBIDE MONONITRATE 30 MG: 30 TABLET ORAL at 08:53

## 2019-04-07 RX ADMIN — Medication 2 PUFF: at 13:40

## 2019-04-07 RX ADMIN — Medication 125 MG: at 20:59

## 2019-04-07 RX ADMIN — HYDROCODONE BITARTRATE AND ACETAMINOPHEN 1 TABLET: 5; 325 TABLET ORAL at 18:30

## 2019-04-07 RX ADMIN — DILTIAZEM HYDROCHLORIDE 30 MG: 30 TABLET, FILM COATED ORAL at 06:21

## 2019-04-07 RX ADMIN — METOPROLOL TARTRATE 50 MG: 25 TABLET ORAL at 08:53

## 2019-04-07 RX ADMIN — Medication 2 PUFF: at 16:30

## 2019-04-07 RX ADMIN — Medication 125 MG: at 08:54

## 2019-04-07 RX ADMIN — Medication 10 ML: at 20:46

## 2019-04-07 ASSESSMENT — PAIN SCALES - GENERAL
PAINLEVEL_OUTOF10: 9
PAINLEVEL_OUTOF10: 8
PAINLEVEL_OUTOF10: 5
PAINLEVEL_OUTOF10: 6
PAINLEVEL_OUTOF10: 0

## 2019-04-07 ASSESSMENT — PAIN DESCRIPTION - ONSET: ONSET: ON-GOING

## 2019-04-07 ASSESSMENT — PAIN DESCRIPTION - LOCATION
LOCATION: RIB CAGE
LOCATION: RIB CAGE

## 2019-04-07 ASSESSMENT — PAIN DESCRIPTION - ORIENTATION
ORIENTATION: LEFT
ORIENTATION: LEFT

## 2019-04-07 ASSESSMENT — PAIN DESCRIPTION - PROGRESSION: CLINICAL_PROGRESSION: NOT CHANGED

## 2019-04-07 ASSESSMENT — PAIN DESCRIPTION - DESCRIPTORS
DESCRIPTORS: ACHING
DESCRIPTORS: ACHING

## 2019-04-07 ASSESSMENT — PAIN DESCRIPTION - PAIN TYPE
TYPE: ACUTE PAIN
TYPE: ACUTE PAIN

## 2019-04-07 ASSESSMENT — PAIN DESCRIPTION - FREQUENCY: FREQUENCY: INTERMITTENT

## 2019-04-07 ASSESSMENT — PAIN - FUNCTIONAL ASSESSMENT: PAIN_FUNCTIONAL_ASSESSMENT: PREVENTS OR INTERFERES SOME ACTIVE ACTIVITIES AND ADLS

## 2019-04-07 ASSESSMENT — PAIN DESCRIPTION - DIRECTION
RADIATING_TOWARDS: BAC
RADIATING_TOWARDS: BACK

## 2019-04-07 NOTE — FLOWSHEET NOTE
2000  Friends of patient came to visit which included young girl approximately 8 yrs old; both young person and woman she came with were at bedside without gown nor mask. This RN discussed option of mask with all 4 persons. The woman stated that everyone at her house had Influenza A, but when this RN questioned whether this child had had Influenza, the woman with the child stated, \"No\". Again, this RN encouraged use of PPE although they did not.

## 2019-04-07 NOTE — PLAN OF CARE
Problem: Falls - Risk of:  Goal: Will remain free from falls  Description  Will remain free from falls  4/7/2019 1439 by Palma Escalona RN  Outcome: Met This Shift  Note:   Continue with fall precautions, up with assist.      Problem: Falls - Risk of:  Goal: Absence of physical injury  Description  Absence of physical injury  4/7/2019 1439 by Palma Escalona RN  Outcome: Met This Shift     Problem: Pain:  Goal: Pain level will decrease  Description  Pain level will decrease  4/7/2019 1439 by Palma Escalona RN  Outcome: Met This Shift  Note:   Pain to left side improved with tylenol. Problem: Pain:  Goal: Control of chronic pain  Description  Control of chronic pain  Outcome: Met This Shift  Note:   Denies chronic pain. Problem: SKIN INTEGRITY  Goal: Skin integrity is maintained or improved  4/7/2019 1439 by Palma Escalona RN  Outcome: Met This Shift  Note:   Bruising noted to left eye. Sutures to laceration on forehead from fall at home. Continue to monitor. Problem: Pain:  Goal: Patient's pain/discomfort is manageable  Description  Patient's pain/discomfort is manageable  4/7/2019 1439 by Palma Escalona RN  Outcome: Ongoing  Note:   Patient states pain to left side/back area, worse with movement, she states she thinks it is from a pulled muscle. Tylenol given for pain. Pain goal 2/10, states pain improved slightly with tylenol. Problem: Pain:  Goal: Control of acute pain  Description  Control of acute pain  4/7/2019 1439 by Palma Escalona RN  Outcome: Ongoing  Note:   Patient able to sleep after tylenol given for left side pain      Problem: Discharge Planning:  Goal: Patients continuum of care needs are met  Description  Patients continuum of care needs are met  4/7/2019 1439 by Palma Escalona RN  Outcome: Ongoing  Note:   Continue discharge planning. Possible discharge tomorrow.      Care plan reviewed with patient   Patient verbalize understanding of the plan of care and contribute to goal setting.

## 2019-04-07 NOTE — PLAN OF CARE
Problem: Falls - Risk of:  Goal: Will remain free from falls  Description  Will remain free from falls  4/7/2019 0651 by London Martinez RN  Outcome: Met This Shift  Note:   Assessment & interventions provided throughout shift. Bed locked & in low position, call light in reach, side-rails up x2, non-slip socks on when ambulating, reminded patient to use call light to call for assistance. Bed alarm utilized and hourly rounding continued  4/6/2019 1742 by Kenard Mohs, RN  Outcome: Met This Shift  Note:   Continue with fall precautions, patient up with assist.      Problem: Falls - Risk of:  Goal: Absence of physical injury  Description  Absence of physical injury  4/7/2019 0651 by London Martinez RN  Outcome: Met This Shift  Note:   No fall or injury tonight. 4/6/2019 1742 by Kenard Mohs, RN  Outcome: Met This Shift     Problem: Pain:  Goal: Patient's pain/discomfort is manageable  Description  Patient's pain/discomfort is manageable  4/7/2019 0651 by London Martinez RN  Outcome: Met This Shift  Note:   Left posterior chest/rib pain for which Tylenol was given and relieved patient's pain except when she coughs. Left back checked and no bruising seen. 4/6/2019 1742 by Kenard Mohs, RN  Outcome: Met This Shift  Note:   Denies pain today. Problem: Pain:  Goal: Pain level will decrease  Description  Pain level will decrease  Outcome: Met This Shift  Note:   Tylenol and a good nights sleep effective in relieving patient's pain     Problem: Pain:  Goal: Control of acute pain  Description  Control of acute pain  Outcome: Met This Shift  Note:   Tylenol effective     Problem: SKIN INTEGRITY  Goal: Skin integrity is maintained or improved  4/7/2019 0651 by London Martinez RN  Outcome: Met This Shift  Note:   Patient turns self frequently. 4/6/2019 1742 by Kenard Mohs, RN  Outcome: Met This Shift  Note:   Continue to assess sutures to forehead.       Problem: Discharge Planning:  Goal: Patients continuum of care needs are met  Description  Patients continuum of care needs are met  4/7/2019 0651 by Lavell Starr RN  Outcome: Ongoing  Note:   Patient and her  are involved in her care. Patient was diagnosed with Influenza A and  has cough as well for which he probably has it too. He's able to care for self and for patient. 4/6/2019 1742 by Wing Aman RN  Outcome: Ongoing  Note:   Continue discharge planning. Patient lives at home with . Care plan reviewed with patient. Patient verbalizes understanding of the care plan and contributed to goal setting.

## 2019-04-07 NOTE — PROGRESS NOTES
Hospitalist Progress Note    Patient:  Berkley Islas      Unit/Bed:3B-33/033-A    YOB: 1950    MRN: [id number]       Acct: [de-identified]     PCP: Susannah Pfeiffer MD    Date of Admission: 4/5/2019    Assessment/Plan:    1. Influenza- minmal sxs of- fatigue x one week; minimal cough. On rx. Has some wheezing this am; will order mdi and cxr  2. c diff pos- no diarrhea till admitted=on vanco  3. parox afib- new- boost metoprolol, added diltiazem. Lytes throid ok. Consulted Dr Amanda Sparks. Concerned re full anticoagulation due to head injury 4.5. Her rhythm looks better today. No afib since pm 4.6.    4. Cad- trops neg, ekg no acute change        Expected discharge date:  1-2 days    Disposition:    [x] Home       [] TCU       [] Rehab       [] Psych       [] SNF       [] [place]       [] Other-    Chief Complaint: dizzy, fell in [place], hit head. Hospital Course: Adm with flu, c diff; being treated for both. Afib, now improved.       Subjective (past 24 hours): cough/wheeze      Medications:  Reviewed    Infusion Medications     Scheduled Medications    albuterol sulfate HFA  2 puff Inhalation 4x daily    potassium (CARDIAC) replacement protocol   Other RX Placeholder    vancomycin  125 mg Oral 4x Daily    oseltamivir  75 mg Oral BID    metoprolol tartrate  50 mg Oral BID    diltiazem  30 mg Oral 4 times per day    [Held by provider] meloxicam  15 mg Oral Daily    isosorbide mononitrate  30 mg Oral Daily    exemestane  25 mg Oral Nightly    clopidogrel  75 mg Oral Daily    citalopram  10 mg Oral Daily    atorvastatin  40 mg Oral Nightly    aspirin  162 mg Oral Daily    sodium chloride flush  10 mL Intravenous 2 times per day     PRN Meds: benzonatate, zolpidem, nitroGLYCERIN, sodium chloride flush, magnesium hydroxide, ondansetron, acetaminophen      Intake/Output Summary (Last 24 hours) at 4/7/2019 0834  Last data filed at 4/7/2019 0430  Gross per 24 hour Intake 1763.48 ml   Output 1200 ml   Net 563.48 ml       Diet:  DIET CARDIAC; No Caffeine    Exam:  /70   Pulse 64   Temp 98.2 °F (36.8 °C) (Oral)   Resp 18   Ht 5' 6\" (1.676 m)   Wt 220 lb 1.6 oz (99.8 kg)   SpO2 96%   BMI 35.53 kg/m²     General appearance: No apparent distress, appears stated age and cooperative. HEENT: sutures forehead  Neck: Supple, with full range of motion. No jugular venous distention. Trachea midline. Respiratory:   Fine wheezes  Cardiovascular: Regular rate and rhythm with normal S1/S2 without murmurs, rubs or gallops. Abdomen: Soft, non-tender, non-distended with normal bowel sounds. Musculoskeletal: passive and active ROM x 4 extremities. Skin: Skin color, texture, turgor normal.  No rashes or lesions. Neurologic:  Neurovascularly intact without any focal sensory/motor deficits. Cranial nerves: II-XII intact, grossly non-focal.  Psychiatric: Alert and oriented, thought content appropriate, normal insight  Capillary Refill: Brisk,< 3 seconds   Peripheral Pulses: +2 palpable, equal bilaterally       Labs:   Recent Labs     04/05/19  1329 04/06/19  0357   WBC 4.5* 5.1   HGB 14.6 12.5   HCT 43.4 40.9    151     Recent Labs     04/05/19  1329 04/06/19  0357   * 137   K 3.9 3.7   CL 98 107   CO2 19* 16*   BUN 30* 22   CREATININE 1.6* 1.0   CALCIUM  --  8.0*     Recent Labs     04/05/19  1329   AST 49*   ALT 33   BILITOT 1.1*   ALKPHOS 46     No results for input(s): INR in the last 72 hours. Recent Labs     04/05/19  1329   TROPONINI 0.065       Microbiology:      Urinalysis:    No results found for: NITRU, WBCUA, BACTERIA, RBCUA, BLOODU, SPECGRAV, GLUCOSEU    Radiology:  XR RIBS LEFT INCLUDE CHEST (MIN 3 VIEWS)   Final Result   1. No rib fracture seen. 2. Small patch atelectasis/pneumonia left parahilar region. **This report has been created using voice recognition software.   It may contain minor errors which are inherent in voice recognition technology. **      Final report electronically signed by Dr. Kathleen Gallagher on 4/5/2019 1:53 PM      CT HEAD WO CONTRAST   Final Result    No evidence of acute intracranial abnormality. **This report has been created using voice recognition software. It may contain minor errors which are inherent in voice recognition technology. **      Final report electronically signed by Dr. Vilma Lloyd MD on 4/5/2019 1:53 PM      XR CHEST STANDARD (2 VW)    (Results Pending)       DVT prophylaxis: [x] Lovenox                                 [] SCDs                                 [] SQ Heparin                                 [] Encourage ambulation           [] Already on Anticoagulation     Code Status: Full Code        Tele:   [x] yes             [] no    Active Hospital Problems    Diagnosis Date Noted    C. difficile diarrhea [A04.72] 04/06/2019    Atrial fibrillation, rapid ([facility] [address] [I48.91] 04/06/2019    Influenza A [J10.1] 04/05/2019       Electronically signed by Tanika Don MD on 4/7/2019 at 8:34 AM

## 2019-04-08 VITALS
BODY MASS INDEX: 35.36 KG/M2 | OXYGEN SATURATION: 96 % | TEMPERATURE: 98.6 F | WEIGHT: 220 LBS | SYSTOLIC BLOOD PRESSURE: 123 MMHG | HEART RATE: 69 BPM | HEIGHT: 66 IN | RESPIRATION RATE: 16 BRPM | DIASTOLIC BLOOD PRESSURE: 79 MMHG

## 2019-04-08 PROCEDURE — 6370000000 HC RX 637 (ALT 250 FOR IP): Performed by: PHARMACIST

## 2019-04-08 PROCEDURE — 6370000000 HC RX 637 (ALT 250 FOR IP): Performed by: INTERNAL MEDICINE

## 2019-04-08 PROCEDURE — 94640 AIRWAY INHALATION TREATMENT: CPT

## 2019-04-08 PROCEDURE — 99238 HOSP IP/OBS DSCHRG MGMT 30/<: CPT | Performed by: INTERNAL MEDICINE

## 2019-04-08 PROCEDURE — 2580000003 HC RX 258: Performed by: INTERNAL MEDICINE

## 2019-04-08 RX ORDER — DILTIAZEM HYDROCHLORIDE 120 MG/1
120 CAPSULE, COATED, EXTENDED RELEASE ORAL DAILY
Qty: 30 CAPSULE | Refills: 3 | Status: SHIPPED | OUTPATIENT
Start: 2019-04-08

## 2019-04-08 RX ORDER — OSELTAMIVIR PHOSPHATE 75 MG/1
75 CAPSULE ORAL 2 TIMES DAILY
Qty: 6 CAPSULE | Refills: 0 | Status: SHIPPED | OUTPATIENT
Start: 2019-04-08 | End: 2019-04-11

## 2019-04-08 RX ORDER — ALBUTEROL SULFATE 90 UG/1
2 AEROSOL, METERED RESPIRATORY (INHALATION) 4 TIMES DAILY
Qty: 1 INHALER | Refills: 3 | Status: SHIPPED | OUTPATIENT
Start: 2019-04-08

## 2019-04-08 RX ADMIN — CITALOPRAM 10 MG: 20 TABLET, FILM COATED ORAL at 09:00

## 2019-04-08 RX ADMIN — Medication 125 MG: at 12:27

## 2019-04-08 RX ADMIN — OSELTAMIVIR PHOSPHATE 75 MG: 75 CAPSULE ORAL at 09:01

## 2019-04-08 RX ADMIN — Medication 10 ML: at 09:03

## 2019-04-08 RX ADMIN — ISOSORBIDE MONONITRATE 30 MG: 30 TABLET ORAL at 09:00

## 2019-04-08 RX ADMIN — Medication 2 PUFF: at 11:57

## 2019-04-08 RX ADMIN — METOPROLOL TARTRATE 50 MG: 25 TABLET ORAL at 09:01

## 2019-04-08 RX ADMIN — Medication 125 MG: at 09:26

## 2019-04-08 RX ADMIN — Medication 2 PUFF: at 08:01

## 2019-04-08 RX ADMIN — DILTIAZEM HYDROCHLORIDE 30 MG: 30 TABLET, FILM COATED ORAL at 04:52

## 2019-04-08 RX ADMIN — BENZONATATE 200 MG: 100 CAPSULE ORAL at 04:54

## 2019-04-08 RX ADMIN — ASPIRIN 162 MG: 325 TABLET ORAL at 09:01

## 2019-04-08 RX ADMIN — CLOPIDOGREL BISULFATE 75 MG: 75 TABLET ORAL at 09:01

## 2019-04-08 RX ADMIN — DILTIAZEM HYDROCHLORIDE 30 MG: 30 TABLET, FILM COATED ORAL at 11:45

## 2019-04-08 ASSESSMENT — PAIN SCALES - GENERAL
PAINLEVEL_OUTOF10: 0

## 2019-04-08 NOTE — PLAN OF CARE
Problem: Impaired respiratory status  Goal: Clear lung sounds  Outcome: Ongoing    Improve breath sounds, increase aeration, and decrease WOB.

## 2019-04-08 NOTE — PROGRESS NOTES
Order for echocardiogram cancelled  Patient had a previous study in Dr Brown Hai office March 2019.   Spoke with Dr Sebastian Cole to confirm ok to cancel

## 2019-04-08 NOTE — DISCHARGE INSTR - DIET

## 2019-04-08 NOTE — CARE COORDINATION
19, 11:28 AM      Britta Valdez       Admitted from: [facility] 2019/ 602 [facility] day: 3   Location: [address] [place] [address] Reason for admit: Pneumonia [J18.9]  Influenza A [J10.1] Status: IP  Admit order signed?: yes  PMH:  has a past medical history of Hypertension and S/P coronary artery stent placement. Procedure: None  Pertinent abnormal Imagin/5 - CXR - Small patch atelectasis/pneumonia left parahilar region. Medications:  Scheduled Meds:   albuterol sulfate HFA  2 puff Inhalation 4x daily    lidocaine  1 patch Transdermal Daily    Or    lidocaine  2 patch Transdermal Daily    Or    lidocaine  3 patch Transdermal Daily    potassium (CARDIAC) replacement protocol   Other RX Placeholder    vancomycin  125 mg Oral 4x Daily    oseltamivir  75 mg Oral BID    metoprolol tartrate  50 mg Oral BID    diltiazem  30 mg Oral 4 times per day    [Held by provider] meloxicam  15 mg Oral Daily    isosorbide mononitrate  30 mg Oral Daily    exemestane  25 mg Oral Nightly    clopidogrel  75 mg Oral Daily    citalopram  10 mg Oral Daily    atorvastatin  40 mg Oral Nightly    aspirin  162 mg Oral Daily    sodium chloride flush  10 mL Intravenous 2 times per day     Continuous Infusions:   Pertinent Info/Orders/Treatment Plan:  Pt admitted from [facility] after sustaining a fall at home resulting in a laceration on her forehead. States she had flu like symptoms at home, got up to go to the bathroom, became dizzy and fell. Treated with iv fluids. [address] Cardiology. Discussed during collaborative rounds, planning discharge today. Diet: DIET CARDIAC; No Caffeine   Smoking status:  reports that she has never smoked.  She has never used smokeless tobacco.   PCP: Kathy Barrientos MD  Readmission: No  Readmission Risk Score: 10%    Discharge Planning  Current Residence:  Private Residence  Living Arrangements:  Spouse/Significant Other   Support Systems:  Spouse/Significant Other, Children  Current Services PTA: Potential Assistance Needed:  N/A  Potential Assistance Purchasing Medications:  No  Does patient want to participate in local refill/ meds to beds program?  No  Type of Home Care Services:  None  Patient expects to be discharged to:  home with   Expected Discharge date:  04/08/19  Follow Up Appointment: Best Day/ Time: Wednesday PM    Discharge Plan: Pt lives at home with her spouse. She still drives and is able to get her appointments. She can afford medications. She denies having equipment at home and denies any needs at discharge. She is planning home today.  Evaluation: no    4/8/19, 11:33 AM    Discharge plan discussed by  and . Discharge plan reviewed with patient/ family. Patient/ family verbalize understanding of discharge plan and are in agreement with plan. Understanding was demonstrated using the teach back method.        IMM Letter  IMM Letter given to Patient/Family/Significant other/Guardian/POA/by[de-identified] Pt Access  IMM Letter date given[de-identified] 04/06/19  IMM Letter time given[de-identified] 8908

## 2019-04-08 NOTE — PROGRESS NOTES
CLINICAL PHARMACY: DISCHARGE MED RECONCILIATION/REVIEW    [facility]) Select Patient?: No  Total # of Interventions Recommended: 1 -   - Updated Order #: 1   -   Total # Interventions Accepted: 1  Intervention Severity:   - Level 1 Intervention Present?: No   - Level 2 #: 0   - Level 3 #: 1   Time Spent (min): 15    Additional Documentation:  AVS reviewed for discharge. Medication section completed.

## 2019-04-08 NOTE — PROGRESS NOTES
Dr. Jez Martinez was in to see the pt. This AM.    Discharge order received. Dr. Washington Crook contacted regarding discharge. Dr. Jez Martinez wanted this Rn to inquire about zio patch or holter monitor. Dr. Washington Crook stated that she did not need one. To discharge and follow up in 6 weeks. INT needles removed. Telemetry was taken off. [address] in  [address] [place] [address] was called to inquire about vancomycin Rx. They were able to be connected with the pt. Regarding co-pay. [address] arrived to bring home going MED UP TO THE PT. Azalea Dickinson from pharmacy has explained medications to the pt. With next dose due explained. This Rn explained discharge instructions to the pt., using teach back method. Follow -up appts. Were reviewed together. Fact information and home care regarding C-Diff abd Flu were explained. The pt. Has verbalized understanding and declined further questions. Spouse was waiting outside to transport the pt. home today  Taken by wheel chair to [facility]. Jaguar Rosales.

## 2019-04-08 NOTE — PLAN OF CARE
Problem: Falls - Risk of:  Goal: Will remain free from falls  Description  Will remain free from falls  4/8/2019 0144 by Namrata Ortega RN  Outcome: Met This Shift  Note:   Patient remained free from falls this shift. Used call light appropriately. Wore nonskid socks when ambulating with assistance. Bed alarm on. Problem: Falls - Risk of:  Goal: Absence of physical injury  Description  Absence of physical injury  4/8/2019 0144 by Namrata Ortega RN  Outcome: Met This Shift  Note:   Patient remained free from physical injury this shift. Used call light appropriately. Wore nonskid socks when ambulating with assistance. Bed alarm on. Problem: SKIN INTEGRITY  Goal: Skin integrity is maintained or improved  4/8/2019 0144 by Namrata Ortega RN  Outcome: Met This Shift  Note:   Patient with sutures above left eye on forehead. Site dry intact with edges well approximated. Bruising to left eye. No other skin breakdown noted. Patient turns self in bed. Pillow support provided. Will continue to monitor skin integrity. Problem: Pain:  Goal: Pain level will decrease  Description  Pain level will decrease  4/8/2019 0144 by Namrata Ortega RN  Outcome: Ongoing  Note:   Patient with some pain this shift. Non-pharmacological pain mgmt - reposition, rest, distraction, elevation, emotional support, etc provided. Will continue to monitor for pain. Will continue to monitor for pain. Patient satisfied with pain mgmt. Problem: Discharge Planning:  Goal: Patients continuum of care needs are met  Description  Patients continuum of care needs are met  4/8/2019 0144 by Namrata Ortega RN  Outcome: Ongoing  Note:   Patient from home with , plans to return home with  at discharge. Possible discharge 1-2 days. Will continue to monitor for discharge needs. Patient participated in plan of care and contributed to goal setting.

## 2019-04-08 NOTE — DISCHARGE SUMMARY
Hospital Medicine Discharge Summary      Patient Identification:   Poppy Andrade   : 1950  MRN: [id number]   Account: [de-identified]      Patient's PCP: Celena Santos MD    Admit Date: 2019     Discharge Date:   2019      Admitting Physician: Jamel Manning DO     Discharge Physician: Dorothy Nicolas MD     Discharge Diagnoses: Active Hospital Problems    Diagnosis Date Noted    C. difficile diarrhea [A04.72] 2019    Atrial fibrillation, rapid ([address] [I48.91] 2019    Influenza A [J10.1] 2019       The patient was seen and examined on day of discharge and this discharge summary is in conjunction with any daily progress note from day of discharge. Hospital Course:   Poppy Andrade is a 79 y.o. female admitted to [facility] on 2019 for a fall resulting in facial laceration. She had been feeling poorly with a cough and also some watery diarrhea. She was admitted to Hospitalist Service. Surprisingly she tested positive for flu and c diff. She was treated for both. She developed afib/rvr on admission and was controlled with combination of beta blocker and diltiazem. This subsided by discharge and she was seen by Dr Uriel Neff who will follow up on her assessment. trops were negative. She will also follow up with Dr Simi Ramirez. .        She did have some wheezing with her flu and was discharged with an albuterol inhaler for short term.       Exam:     Vitals:  Vitals:    19 2045 19 2110 19 0015 19 0449   BP: 112/78  106/68 137/80   Pulse: 68  60 66   Resp: 18 16 14 16   Temp: 97.8 °F (36.6 °C)  98.7 °F (37.1 °C) 98.3 °F (36.8 °C)   TempSrc: Oral  Oral Oral   SpO2: 95% 96% 96% 96%   Weight:    220 lb (99.8 kg)   Height:         Weight: Weight: 220 lb (99.8 kg)     24 hour intake/output:    Intake/Output Summary (Last 24 hours) at 2019 5154  Last data filed at 2019 7821  Gross per 24 hour   Intake 1390 ml   Output 600 ml   Net 790 ml         General appearance:  No apparent distress, appears stated age and cooperative. HEENT:  Normal cephalic, atraumatic without obvious deformity. Pupils equal, round, and reactive to light. Extra ocular muscles intact. Conjunctivae/corneas clear. Neck: Supple, with full range of motion. No jugular venous distention. Trachea midline. Respiratory:  Normal respiratory effort. Clear to auscultation, bilaterally without Rales/Wheezes/Rhonchi. Cardiovascular:  Regular rate and rhythm with normal S1/S2 without murmurs, rubs or gallops. Abdomen: Soft, non-tender, non-distended with normal bowel sounds. Musculoskeletal:  No clubbing, cyanosis or edema bilaterally. Full range of motion without deformity. Skin: Skin color, texture, turgor normal.  No rashes or lesions. Neurologic:  Neurovascularly intact without any focal sensory/motor deficits. Cranial nerves: II-XII intact, grossly non-focal.  Psychiatric:  Alert and oriented, thought content appropriate, normal insight  Capillary Refill: Brisk,< 3 seconds   Peripheral Pulses: +2 palpable, equal bilaterally       Labs: For convenience and continuity at follow-up the following most recent labs are provided:      CBC:    Lab Results   Component Value Date    WBC 5.1 04/06/2019    HGB 12.5 04/06/2019    HCT 40.9 04/06/2019     04/06/2019       Renal:    Lab Results   Component Value Date     04/06/2019    K 3.7 04/06/2019     04/06/2019    CO2 16 04/06/2019    BUN 22 04/06/2019    CREATININE 1.0 04/06/2019    CALCIUM 8.0 04/06/2019         Significant Diagnostic Studies    Radiology:   XR CHEST STANDARD (2 VW)   Final Result   Stable radiographic appearance of the chest. No evidence of an acute process. **This report has been created using voice recognition software. It may contain minor errors which are inherent in voice recognition technology. **      Final report electronically signed by Dr. Casey Roman MD ARTIE on 4/7/2019 2:25 PM      XR RIBS LEFT INCLUDE CHEST (MIN 3 VIEWS)   Final Result   1. No rib fracture seen. 2. Small patch atelectasis/pneumonia left parahilar region. **This report has been created using voice recognition software. It may contain minor errors which are inherent in voice recognition technology. **      Final report electronically signed by Dr. Tan Gee on 4/5/2019 1:53 PM      CT HEAD WO CONTRAST   Final Result    No evidence of acute intracranial abnormality. **This report has been created using voice recognition software. It may contain minor errors which are inherent in voice recognition technology. **      Final report electronically signed by Dr. Cristiana Quick MD on 4/5/2019 1:53 PM             Consults:     IP CONSULT TO CARDIOLOGY  IP CONSULT TO CARDIOLOGY    Disposition: Home  Condition at Discharge: Stable    Code Status:  Full Code     Patient Instructions:    Discharge lab work:    Activity: activity as tolerated  Diet: DIET CARDIAC; No Caffeine      Follow-up visits:   Maci Coelho MD  [address] [facility]  [phone]               Discharge Medications:      Taj Stark   Home Medication Instructions STEPHANY:[id number]    Printed on:04/08/19 8455   Medication Information                      aspirin 81 MG tablet  Take 162 mg by mouth daily              atorvastatin (LIPITOR) 40 MG tablet  Take 1 tablet by mouth nightly             citalopram (CELEXA) 10 MG tablet  Take 10 mg by mouth daily             clopidogrel (PLAVIX) 75 MG tablet  Take 1 tablet by mouth daily             exemestane (AROMASIN) 25 MG chemo tablet  Take 25 mg by mouth nightly             isosorbide mononitrate (IMDUR) 30 MG extended release tablet  Take 30 mg by mouth daily             losartan (COZAAR) 50 MG tablet  Take 50 mg by mouth daily             meloxicam (MOBIC) 15 MG tablet  Take 15 mg by mouth daily             metoprolol (LOPRESSOR) 25 MG tablet  Take 25 mg by mouth 2 times daily             nitroGLYCERIN (NITROSTAT) 0.4 MG SL tablet  Place 1 tablet under the tongue every 5 minutes as needed for Chest pain             omeprazole (PRILOSEC) 20 MG delayed release capsule  Take 20 mg by mouth daily             zolpidem (AMBIEN) 10 MG tablet  Take by mouth nightly as needed for Sleep                 Time Spent on discharge is more than 30 minutes in the examination, evaluation, counseling and review of medications and discharge plan. Signed: Thank you Bridgett Frankel, MD for the opportunity to be involved in this patient's care.     Electronically signed by Rochelle Alvarado MD on 4/8/2019 at 6:52 AM

## 2019-04-11 LAB — BLOOD CULTURE, ROUTINE: NORMAL

## 2019-04-12 LAB — BLOOD CULTURE, ROUTINE: NORMAL

## 2019-05-06 PROBLEM — J10.1 INFLUENZA A: Status: RESOLVED | Noted: 2019-04-05 | Resolved: 2019-05-06

## 2022-05-14 PROBLEM — I25.10 CAD (CORONARY ARTERY DISEASE): Status: ACTIVE | Noted: 2022-05-14

## 2023-07-26 ENCOUNTER — HOSPITAL ENCOUNTER (EMERGENCY)
Age: 73
Discharge: OTHER INSTITUTION | End: 2023-07-27
Payer: MEDICARE

## 2023-07-26 DIAGNOSIS — F22 DELUSIONS (HCC): Primary | ICD-10-CM

## 2023-07-26 PROCEDURE — 99285 EMERGENCY DEPT VISIT HI MDM: CPT

## 2023-07-26 ASSESSMENT — PAIN - FUNCTIONAL ASSESSMENT: PAIN_FUNCTIONAL_ASSESSMENT: NONE - DENIES PAIN

## 2023-07-27 VITALS
SYSTOLIC BLOOD PRESSURE: 148 MMHG | HEART RATE: 73 BPM | TEMPERATURE: 98 F | RESPIRATION RATE: 18 BRPM | DIASTOLIC BLOOD PRESSURE: 69 MMHG | OXYGEN SATURATION: 98 %

## 2023-07-27 LAB
ALBUMIN SERPL BCG-MCNC: 3.8 G/DL (ref 3.5–5.1)
ALP SERPL-CCNC: 59 U/L (ref 38–126)
ALT SERPL W/O P-5'-P-CCNC: < 5 U/L (ref 11–66)
AMPHETAMINES UR QL SCN: NEGATIVE
ANION GAP SERPL CALC-SCNC: 9 MEQ/L (ref 8–16)
APAP SERPL-MCNC: 5.2 UG/ML (ref 0–20)
AST SERPL-CCNC: 14 U/L (ref 5–40)
BACTERIA URNS QL MICRO: ABNORMAL /HPF
BARBITURATES UR QL SCN: NEGATIVE
BASOPHILS ABSOLUTE: 0 THOU/MM3 (ref 0–0.1)
BASOPHILS NFR BLD AUTO: 0.4 %
BENZODIAZ UR QL SCN: NEGATIVE
BILIRUB CONJ SERPL-MCNC: < 0.2 MG/DL (ref 0–0.3)
BILIRUB SERPL-MCNC: 0.8 MG/DL (ref 0.3–1.2)
BILIRUB UR QL STRIP.AUTO: NEGATIVE
BUN SERPL-MCNC: 13 MG/DL (ref 7–22)
BZE UR QL SCN: NEGATIVE
CALCIUM SERPL-MCNC: 9.1 MG/DL (ref 8.5–10.5)
CANNABINOIDS UR QL SCN: NEGATIVE
CASTS #/AREA URNS LPF: ABNORMAL /LPF
CASTS 2: ABNORMAL /LPF
CHARACTER UR: CLEAR
CHLORIDE SERPL-SCNC: 102 MEQ/L (ref 98–111)
CO2 SERPL-SCNC: 23 MEQ/L (ref 23–33)
COLOR: YELLOW
CREAT SERPL-MCNC: 1.4 MG/DL (ref 0.4–1.2)
CRYSTALS URNS MICRO: ABNORMAL
DEPRECATED RDW RBC AUTO: 54.4 FL (ref 35–45)
EOSINOPHIL NFR BLD AUTO: 0.5 %
EOSINOPHILS ABSOLUTE: 0 THOU/MM3 (ref 0–0.4)
EPITHELIAL CELLS, UA: ABNORMAL /HPF
ERYTHROCYTE [DISTWIDTH] IN BLOOD BY AUTOMATED COUNT: 16.5 % (ref 11.5–14.5)
ETHANOL SERPL-MCNC: < 0.01 %
FENTANYL: NEGATIVE
FLUAV RNA RESP QL NAA+PROBE: NOT DETECTED
FLUBV RNA RESP QL NAA+PROBE: NOT DETECTED
GFR SERPL CREATININE-BSD FRML MDRD: 40 ML/MIN/1.73M2
GLUCOSE SERPL-MCNC: 127 MG/DL (ref 70–108)
GLUCOSE UR QL STRIP.AUTO: NEGATIVE MG/DL
HCT VFR BLD AUTO: 42.2 % (ref 37–47)
HGB BLD-MCNC: 12.9 GM/DL (ref 12–16)
HGB UR QL STRIP.AUTO: ABNORMAL
IMM GRANULOCYTES # BLD AUTO: 0.11 THOU/MM3 (ref 0–0.07)
IMM GRANULOCYTES NFR BLD AUTO: 1.4 %
KETONES UR QL STRIP.AUTO: NEGATIVE
LYMPHOCYTES ABSOLUTE: 1.2 THOU/MM3 (ref 1–4.8)
LYMPHOCYTES NFR BLD AUTO: 15.4 %
MCH RBC QN AUTO: 27.5 PG (ref 26–33)
MCHC RBC AUTO-ENTMCNC: 30.6 GM/DL (ref 32.2–35.5)
MCV RBC AUTO: 90 FL (ref 81–99)
MISCELLANEOUS 2: ABNORMAL
MONOCYTES ABSOLUTE: 0.7 THOU/MM3 (ref 0.4–1.3)
MONOCYTES NFR BLD AUTO: 8.8 %
NEUTROPHILS NFR BLD AUTO: 73.5 %
NITRITE UR QL STRIP: NEGATIVE
NRBC BLD AUTO-RTO: 0 /100 WBC
OPIATES UR QL SCN: NEGATIVE
OSMOLALITY SERPL CALC.SUM OF ELEC: 269.9 MOSMOL/KG (ref 275–300)
OXYCODONE, OPI5M: NEGATIVE
PCP UR QL SCN: NEGATIVE
PH UR STRIP.AUTO: 6 [PH] (ref 5–9)
PLATELET # BLD AUTO: 251 THOU/MM3 (ref 130–400)
PMV BLD AUTO: 9.2 FL (ref 9.4–12.4)
POTASSIUM SERPL-SCNC: 4.3 MEQ/L (ref 3.5–5.2)
PROT SERPL-MCNC: 6.5 G/DL (ref 6.1–8)
PROT UR STRIP.AUTO-MCNC: NEGATIVE MG/DL
RBC # BLD AUTO: 4.69 MILL/MM3 (ref 4.2–5.4)
RBC URINE: ABNORMAL /HPF
RENAL EPI CELLS #/AREA URNS HPF: ABNORMAL /[HPF]
SALICYLATES SERPL-MCNC: 0.3 MG/DL (ref 2–10)
SARS-COV-2 RNA RESP QL NAA+PROBE: NOT DETECTED
SEGMENTED NEUTROPHILS ABSOLUTE COUNT: 5.7 THOU/MM3 (ref 1.8–7.7)
SODIUM SERPL-SCNC: 134 MEQ/L (ref 135–145)
SP GR UR REFRACT.AUTO: < 1.005 (ref 1–1.03)
TSH SERPL DL<=0.005 MIU/L-ACNC: 4.16 UIU/ML (ref 0.4–4.2)
UROBILINOGEN, URINE: 0.2 EU/DL (ref 0–1)
WBC # BLD AUTO: 7.8 THOU/MM3 (ref 4.8–10.8)
WBC #/AREA URNS HPF: ABNORMAL /HPF
WBC #/AREA URNS HPF: NEGATIVE /[HPF]
YEAST LIKE FUNGI URNS QL MICRO: ABNORMAL

## 2023-07-27 PROCEDURE — 36415 COLL VENOUS BLD VENIPUNCTURE: CPT

## 2023-07-27 PROCEDURE — 80143 DRUG ASSAY ACETAMINOPHEN: CPT

## 2023-07-27 PROCEDURE — 84443 ASSAY THYROID STIM HORMONE: CPT

## 2023-07-27 PROCEDURE — 81001 URINALYSIS AUTO W/SCOPE: CPT

## 2023-07-27 PROCEDURE — 80307 DRUG TEST PRSMV CHEM ANLYZR: CPT

## 2023-07-27 PROCEDURE — 85025 COMPLETE CBC W/AUTO DIFF WBC: CPT

## 2023-07-27 PROCEDURE — 80053 COMPREHEN METABOLIC PANEL: CPT

## 2023-07-27 PROCEDURE — 82077 ASSAY SPEC XCP UR&BREATH IA: CPT

## 2023-07-27 PROCEDURE — 87636 SARSCOV2 & INF A&B AMP PRB: CPT

## 2023-07-27 PROCEDURE — 82248 BILIRUBIN DIRECT: CPT

## 2023-07-27 PROCEDURE — 80179 DRUG ASSAY SALICYLATE: CPT

## 2023-07-27 RX ORDER — LORAZEPAM 1 MG/1
0.5 TABLET ORAL ONCE
Status: DISCONTINUED | OUTPATIENT
Start: 2023-07-27 | End: 2023-07-27 | Stop reason: HOSPADM

## 2023-07-27 ASSESSMENT — PAIN - FUNCTIONAL ASSESSMENT: PAIN_FUNCTIONAL_ASSESSMENT: NONE - DENIES PAIN

## 2023-07-27 NOTE — PROGRESS NOTES
6530  Handover from [employer]. Pt to be admitted to [facility]. [address] has been faxed. Awaiting covid result to fax to [facility] as well. Per Marcus So in the ER, labs are not resulting for any pt at this time so that is why there is no result in the chart.

## 2023-07-27 NOTE — PROGRESS NOTES
Pt continues to wander around safe room area, pulling on door multiple times. ER provider notified of pt behaviors.

## 2023-07-27 NOTE — ED NOTES
Patient in bed lying on side with eyes closed. Patient appears to be resting at this time. Patient respirations are regular and unlabored. 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 Will continue to monitor patient and call light within patients reach.        Ke Jimenez RN  07/27/23 4638

## 2023-07-27 NOTE — ED NOTES
Pt resting quietly with eyes closed at this time. Lights dimmed for comfort. Constant observation in place for pt safety.       Conchis Sanchez RN  07/27/23 1273

## 2023-07-27 NOTE — ED PROVIDER NOTES
[address] [facility]     Pt Name: Chad Boyce  MRN: [id number]  [address] [facility] [place] 1950  Date of evaluation: 7/27/23        Mid-level provider Note:    I have personally performed and/or participated in the history, exam and medical decision making and agree with all pertinent clinical information as noted by the previous provider. I have also reviewed and agree with the past medical, family and social history unless otherwise noted. I have personally performed a face to face diagnostic evaluation on this patient. I have reviewed the previous provider's findings and agree. Evaluation:      Patient report received from [place] Edouard CNP    Patient resting comfortably in bed. Patient updated on plan of care. Plans for transfer. COVID and influenza negative. Patient has been accepted at [facility]. Transport at approximately 630 tonight    Patient is restless trying to open the door of the safe rooms stating she has lost her keys. Patient is pacing. 0.5 mg Ativan ordered. Patient report given to [place] Edouard, [facility]    ED Course as of 07/27/23 1004   Thu Jul 27, 2023   1004 COVID-19 & Influenza Combo:    SARS-CoV-2 RNA, RT PCR NOT DETECTED   INFLUENZA A NOT DETECTED   INFLUENZA B NOT DETECTED  negative [SC]      ED Course User Index  [SC] ARIA Dodson - CNP       No results found. DIAGNOSIS  1. Delusions ([address])           DISPOSITION/PLAN  PATIENT REFERRED TO:  No follow-up provider specified.   DISCHARGE MEDICATIONS:  New Prescriptions    No medications on file         ARIA Dodson - [address], ARIA - CNP  07/27/23 2719

## 2023-07-27 NOTE — ED NOTES
Patient sitting in chair watching tv at this time. Patient respirations are regular and unlabored. Patient appears to be in no current distress. Call light is within reach. Patient expresses no needs at this time.          Isela Rich RN  07/27/23 9407

## 2023-07-27 NOTE — ED NOTES
Patient sitting in chair at the bedside awaiting transport to another facility. Patient denies further needs. Patient respirations are regular and unlabored. Patient appears to be in no current distress. Call light is within reach. Patient expresses no needs at this time.        Jarvis Fung RN  07/27/23 6766

## 2023-07-27 NOTE — ED NOTES
Pt resting in bed. Pt denies discomfort. Lights in room dimmed for comfort. Constant observation in place for pt safety.       Ron Berrios RN  07/27/23 3235

## 2023-07-27 NOTE — ED NOTES
Patient searching around safe rooms for her \"papers and money\" at this time. Patient redirected at this time but remains frantic to find her items. Patient refusing medication to help her calm down at this time. Patient respirations are regular and unlabored. Patient appears to be in no current distress. Patient VSS. Call light is within reach. Patient expresses no needs at this time.        Minnie Aguero RN  07/27/23 1600

## 2023-07-27 NOTE — PROGRESS NOTES
Call from [facility] with [facility]     She has received everything needed for transfer / patient has been accepted     CALLER'S NAME: Lubna Hernández  BED NUMBER: 4th Floor  LEVEL OF CARE: Psychiatry  REPORT NUMBER: [phone]  ACCEPTING MD (FULL NAME) Rayray Lancasteregajohnny  ACCEPTING FACILITY: [facility] call them with transport eta    She is requesting pt's weight. Call transferred to the ED.
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
## 2023-07-27 NOTE — ED TRIAGE NOTES
Pt comes to ED for psych eval. Pt is accompanied by her friend. Pt states that she as been taking all prescribed medications and denies alcohol and drug use. Pt reports that she has been having visual and auditory hallucinations. Pt reports seeing \"cats\" and \"snowmen\" on her drive to the ED. PT denies SI or HI. Patient placed in safe room that is ligature resistant with continuous monitoring in place. Provider notified, requested an assessment by behavioral health . Patient belongings secured in a locked lockers outside of the room. Explained suicide prevention precautions to the patient including constant observer.

## 2023-07-27 NOTE — ED NOTES
Pt resting quietly in bed at this time. Visitor at bedside. Constant observation in place for pt safety.       Marija Razo RN  07/27/23 8311
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
## 2023-07-27 NOTE — ED NOTES
This nurse received bedside shift report at this time from [place], [place].      Minnei Aguero RN  07/27/23 1105